# Patient Record
Sex: FEMALE | Race: WHITE | Employment: OTHER | URBAN - METROPOLITAN AREA
[De-identification: names, ages, dates, MRNs, and addresses within clinical notes are randomized per-mention and may not be internally consistent; named-entity substitution may affect disease eponyms.]

---

## 2020-06-03 LAB
ALBUMIN SERPL-MCNC: NORMAL G/DL
ALP BLD-CCNC: NORMAL U/L
ALT SERPL-CCNC: NORMAL U/L
ANION GAP SERPL CALCULATED.3IONS-SCNC: NORMAL MMOL/L
AST SERPL-CCNC: NORMAL U/L
BASOPHILS ABSOLUTE: NORMAL
BASOPHILS RELATIVE PERCENT: NORMAL
BILIRUB SERPL-MCNC: NORMAL MG/DL
BUN BLDV-MCNC: NORMAL MG/DL
CALCIUM SERPL-MCNC: NORMAL MG/DL
CHLORIDE BLD-SCNC: NORMAL MMOL/L
CHOLESTEROL, TOTAL: NORMAL
CHOLESTEROL/HDL RATIO: NORMAL
CO2: NORMAL
CREAT SERPL-MCNC: NORMAL MG/DL
EOSINOPHILS ABSOLUTE: NORMAL
EOSINOPHILS RELATIVE PERCENT: NORMAL
GFR CALCULATED: NORMAL
GLUCOSE BLD-MCNC: NORMAL MG/DL
HCT VFR BLD CALC: NORMAL %
HDLC SERPL-MCNC: NORMAL MG/DL
HEMOGLOBIN: NORMAL
LDL CHOLESTEROL CALCULATED: NORMAL
LYMPHOCYTES ABSOLUTE: NORMAL
LYMPHOCYTES RELATIVE PERCENT: NORMAL
MCH RBC QN AUTO: NORMAL PG
MCHC RBC AUTO-ENTMCNC: NORMAL G/DL
MCV RBC AUTO: NORMAL FL
MONOCYTES ABSOLUTE: NORMAL
MONOCYTES RELATIVE PERCENT: NORMAL
NEUTROPHILS ABSOLUTE: NORMAL
NEUTROPHILS RELATIVE PERCENT: NORMAL
NONHDLC SERPL-MCNC: NORMAL MG/DL
PLATELET # BLD: NORMAL 10*3/UL
PMV BLD AUTO: NORMAL FL
POTASSIUM SERPL-SCNC: NORMAL MMOL/L
RBC # BLD: NORMAL 10*6/UL
SODIUM BLD-SCNC: NORMAL MMOL/L
TOTAL PROTEIN: NORMAL
TRIGL SERPL-MCNC: NORMAL MG/DL
VLDLC SERPL CALC-MCNC: NORMAL MG/DL
WBC # BLD: NORMAL 10*3/UL

## 2020-07-21 RX ORDER — CYANOCOBALAMIN 1000 UG/ML
1000 INJECTION INTRAMUSCULAR; SUBCUTANEOUS ONCE
Qty: 1 ML | Refills: 0 | Status: SHIPPED | OUTPATIENT
Start: 2020-07-21 | End: 2021-07-06

## 2020-08-27 RX ORDER — RALOXIFENE HYDROCHLORIDE 60 MG/1
60 TABLET, FILM COATED ORAL DAILY
COMMUNITY
End: 2020-12-23 | Stop reason: SDUPTHER

## 2020-08-27 RX ORDER — MULTIVIT-MIN/IRON/FOLIC ACID/K 18-600-40
CAPSULE ORAL
COMMUNITY
End: 2022-08-08

## 2020-08-27 RX ORDER — LOSARTAN POTASSIUM 100 MG/1
100 TABLET ORAL DAILY
COMMUNITY
End: 2021-03-04 | Stop reason: SDUPTHER

## 2020-08-27 RX ORDER — VIT C/B6/B5/MAGNESIUM/HERB 173 50-5-6-5MG
CAPSULE ORAL
COMMUNITY
End: 2021-12-27

## 2020-08-27 RX ORDER — HYDROCHLOROTHIAZIDE 25 MG/1
25 TABLET ORAL DAILY
COMMUNITY
End: 2021-03-04 | Stop reason: SDUPTHER

## 2020-08-27 RX ORDER — OMEPRAZOLE 40 MG/1
40 CAPSULE, DELAYED RELEASE ORAL DAILY
COMMUNITY
End: 2021-03-04 | Stop reason: SDUPTHER

## 2020-08-27 RX ORDER — NAPROXEN 500 MG/1
500 TABLET ORAL 2 TIMES DAILY WITH MEALS
COMMUNITY
End: 2022-01-14

## 2020-08-27 RX ORDER — ROSUVASTATIN CALCIUM 10 MG/1
10 TABLET, COATED ORAL DAILY
COMMUNITY
End: 2021-03-04 | Stop reason: SDUPTHER

## 2020-08-27 RX ORDER — ASPIRIN 81 MG/1
81 TABLET ORAL DAILY
COMMUNITY

## 2020-08-27 RX ORDER — AMLODIPINE BESYLATE 5 MG/1
5 TABLET ORAL DAILY
COMMUNITY
End: 2021-01-11 | Stop reason: SDUPTHER

## 2020-08-27 RX ORDER — LOSARTAN POTASSIUM AND HYDROCHLOROTHIAZIDE 25; 100 MG/1; MG/1
1 TABLET ORAL DAILY
COMMUNITY
End: 2021-01-11

## 2020-08-27 SDOH — HEALTH STABILITY: MENTAL HEALTH: HOW OFTEN DO YOU HAVE A DRINK CONTAINING ALCOHOL?: NEVER

## 2020-12-23 RX ORDER — RALOXIFENE HYDROCHLORIDE 60 MG/1
60 TABLET, FILM COATED ORAL DAILY
Qty: 90 TABLET | Refills: 1 | Status: SHIPPED
Start: 2020-12-23 | End: 2021-06-30

## 2021-01-06 ENCOUNTER — NURSE ONLY (OUTPATIENT)
Dept: PRIMARY CARE CLINIC | Age: 81
End: 2021-01-06
Payer: COMMERCIAL

## 2021-01-06 DIAGNOSIS — K57.30 DIVERTICULAR DISEASE OF COLON: ICD-10-CM

## 2021-01-06 DIAGNOSIS — G45.9 TRANSIENT ISCHEMIC ATTACK: ICD-10-CM

## 2021-01-06 DIAGNOSIS — E78.5 HYPERLIPIDEMIA, UNSPECIFIED HYPERLIPIDEMIA TYPE: ICD-10-CM

## 2021-01-06 DIAGNOSIS — I10 BENIGN HYPERTENSION: ICD-10-CM

## 2021-01-06 DIAGNOSIS — E78.5 HYPERLIPIDEMIA, UNSPECIFIED HYPERLIPIDEMIA TYPE: Primary | ICD-10-CM

## 2021-01-06 LAB
BASOPHILS ABSOLUTE: 0.07 E9/L (ref 0–0.2)
BASOPHILS RELATIVE PERCENT: 1 % (ref 0–2)
CHOLESTEROL, TOTAL: 191 MG/DL (ref 0–199)
EOSINOPHILS ABSOLUTE: 0.89 E9/L (ref 0.05–0.5)
EOSINOPHILS RELATIVE PERCENT: 12.6 % (ref 0–6)
HCT VFR BLD CALC: 42.7 % (ref 34–48)
HDLC SERPL-MCNC: 88 MG/DL
HEMOGLOBIN: 13.8 G/DL (ref 11.5–15.5)
IMMATURE GRANULOCYTES #: 0.01 E9/L
IMMATURE GRANULOCYTES %: 0.1 % (ref 0–5)
LDL CHOLESTEROL CALCULATED: 90 MG/DL (ref 0–99)
LYMPHOCYTES ABSOLUTE: 2.58 E9/L (ref 1.5–4)
LYMPHOCYTES RELATIVE PERCENT: 36.4 % (ref 20–42)
MCH RBC QN AUTO: 29.7 PG (ref 26–35)
MCHC RBC AUTO-ENTMCNC: 32.3 % (ref 32–34.5)
MCV RBC AUTO: 92 FL (ref 80–99.9)
MONOCYTES ABSOLUTE: 0.63 E9/L (ref 0.1–0.95)
MONOCYTES RELATIVE PERCENT: 8.9 % (ref 2–12)
NEUTROPHILS ABSOLUTE: 2.91 E9/L (ref 1.8–7.3)
NEUTROPHILS RELATIVE PERCENT: 41 % (ref 43–80)
PDW BLD-RTO: 13 FL (ref 11.5–15)
PLATELET # BLD: 288 E9/L (ref 130–450)
PMV BLD AUTO: 9.8 FL (ref 7–12)
RBC # BLD: 4.64 E12/L (ref 3.5–5.5)
TRIGL SERPL-MCNC: 63 MG/DL (ref 0–149)
VLDLC SERPL CALC-MCNC: 13 MG/DL
WBC # BLD: 7.1 E9/L (ref 4.5–11.5)

## 2021-01-06 PROCEDURE — 36415 COLL VENOUS BLD VENIPUNCTURE: CPT | Performed by: NURSE PRACTITIONER

## 2021-01-07 LAB
ALBUMIN SERPL-MCNC: 4 G/DL (ref 3.5–5.2)
ALP BLD-CCNC: 78 U/L (ref 35–104)
ALT SERPL-CCNC: 18 U/L (ref 0–32)
ANION GAP SERPL CALCULATED.3IONS-SCNC: 13 MMOL/L (ref 7–16)
AST SERPL-CCNC: 14 U/L (ref 0–31)
BILIRUB SERPL-MCNC: 0.5 MG/DL (ref 0–1.2)
BUN BLDV-MCNC: 15 MG/DL (ref 8–23)
CALCIUM SERPL-MCNC: 10.1 MG/DL (ref 8.6–10.2)
CHLORIDE BLD-SCNC: 103 MMOL/L (ref 98–107)
CO2: 25 MMOL/L (ref 22–29)
CREAT SERPL-MCNC: 0.8 MG/DL (ref 0.5–1)
GFR AFRICAN AMERICAN: >60
GFR NON-AFRICAN AMERICAN: >60 ML/MIN/1.73
GLUCOSE BLD-MCNC: 90 MG/DL (ref 74–99)
POTASSIUM SERPL-SCNC: 4.3 MMOL/L (ref 3.5–5)
SODIUM BLD-SCNC: 141 MMOL/L (ref 132–146)
TOTAL PROTEIN: 6.8 G/DL (ref 6.4–8.3)
TSH SERPL DL<=0.05 MIU/L-ACNC: 2.8 UIU/ML (ref 0.27–4.2)

## 2021-01-11 ENCOUNTER — OFFICE VISIT (OUTPATIENT)
Dept: PRIMARY CARE CLINIC | Age: 81
End: 2021-01-11
Payer: COMMERCIAL

## 2021-01-11 VITALS
HEART RATE: 74 BPM | BODY MASS INDEX: 22.82 KG/M2 | TEMPERATURE: 98.1 F | SYSTOLIC BLOOD PRESSURE: 115 MMHG | OXYGEN SATURATION: 99 % | DIASTOLIC BLOOD PRESSURE: 80 MMHG | HEIGHT: 65 IN | WEIGHT: 137 LBS | RESPIRATION RATE: 18 BRPM

## 2021-01-11 DIAGNOSIS — E53.9 VITAMIN B DEFICIENCY: ICD-10-CM

## 2021-01-11 DIAGNOSIS — I10 ESSENTIAL HYPERTENSION: ICD-10-CM

## 2021-01-11 DIAGNOSIS — G31.9 CEREBRAL ATROPHY (HCC): ICD-10-CM

## 2021-01-11 DIAGNOSIS — I67.9 CEREBROVASCULAR SMALL VESSEL DISEASE: ICD-10-CM

## 2021-01-11 DIAGNOSIS — K57.30 DIVERTICULAR DISEASE OF COLON: ICD-10-CM

## 2021-01-11 DIAGNOSIS — E78.00 HYPERCHOLESTEROLEMIA: Primary | ICD-10-CM

## 2021-01-11 DIAGNOSIS — K21.9 GASTROESOPHAGEAL REFLUX DISEASE WITHOUT ESOPHAGITIS: ICD-10-CM

## 2021-01-11 DIAGNOSIS — Z13.29 SCREENING FOR THYROID DISORDER: ICD-10-CM

## 2021-01-11 PROCEDURE — 99214 OFFICE O/P EST MOD 30 MIN: CPT | Performed by: NURSE PRACTITIONER

## 2021-01-11 RX ORDER — AMLODIPINE BESYLATE 5 MG/1
5 TABLET ORAL DAILY
Qty: 90 TABLET | Refills: 1 | Status: SHIPPED
Start: 2021-01-11 | End: 2021-07-15 | Stop reason: SDUPTHER

## 2021-01-11 ASSESSMENT — ENCOUNTER SYMPTOMS
CONSTIPATION: 0
WHEEZING: 0
VOICE CHANGE: 0
CHEST TIGHTNESS: 0
NAUSEA: 0
COUGH: 0
ABDOMINAL PAIN: 0
BLOOD IN STOOL: 0
VOMITING: 0
SHORTNESS OF BREATH: 0
DIARRHEA: 0
BACK PAIN: 0
TROUBLE SWALLOWING: 0

## 2021-01-11 ASSESSMENT — PATIENT HEALTH QUESTIONNAIRE - PHQ9: SUM OF ALL RESPONSES TO PHQ QUESTIONS 1-9: 0

## 2021-01-11 NOTE — PATIENT INSTRUCTIONS
Patient Education        Learning About Diverticulosis and Diverticulitis  What are diverticulosis and diverticulitis? In diverticulosis and diverticulitis, pouches called diverticula form in the wall of the large intestine, or colon. · In diverticulosis, the pouches do not cause any pain or other symptoms. · In diverticulitis, the pouches get inflamed or infected and cause symptoms. Doctors aren't sure what causes these pouches in the colon. But they think that a low-fiber diet may play a role. Without fiber to add bulk to the stool, the colon has to work harder than normal to push the stool forward. The pressure from this may cause pouches to form in weak spots along the colon. Some people with diverticulosis get diverticulitis. But experts don't know why this happens. What are the symptoms? · In diverticulosis, most people don't have symptoms. But pouches sometimes bleed. · In diverticulitis, symptoms may last from a few hours to a week or more. They include:  ? Belly pain. This is usually in the lower left side. It is sometimes worse when you move. This is the most common symptom. ? Fever and chills. ? Bloating and gas. ? Diarrhea or constipation. ? Nausea and sometimes vomiting.  ? Not feeling like eating. How can you prevent diverticulitis? You may be able to lower your chance of getting diverticulitis. You can do this by taking steps to prevent constipation. · Eat fruits, vegetables, beans, and whole grains every day. These foods are high in fiber. · Drink plenty of fluids. (Drink enough so that your urine is light yellow or clear like water.) If you have kidney, heart, or liver disease and have to limit fluids, talk with your doctor before you increase the amount of fluids you drink. · Get at least 30 minutes of exercise on most days of the week. Walking is a good choice.  You also may want to do other activities, such as running, swimming, cycling, or playing tennis or team sports. · Take a fiber supplement, such as Citrucel or Metamucil, every day if needed. Read and follow all instructions on the label. · Schedule time each day for a bowel movement. Having a daily routine may help. Take your time and do not strain when having a bowel movement. Some people avoid nuts, seeds, berries, and popcorn. They believe that these foods might get trapped in the diverticula and cause pain. But there is no proof that these foods cause diverticulitis or make it worse. How are these problems treated? · The best way to treat diverticulosis is to avoid constipation. · Treatment for diverticulitis includes antibiotics. It often includes a change in your diet. You may need only liquids at first. Your doctor may suggest pain medicines for pain or belly cramps. In some cases, surgery may be needed. Follow-up care is a key part of your treatment and safety. Be sure to make and go to all appointments, and call your doctor if you are having problems. It's also a good idea to know your test results and keep a list of the medicines you take. Where can you learn more? Go to https://THYME.LiveRe. org and sign in to your Torrential account. Enter C282 in the [facility] box to learn more about \"Learning About Diverticulosis and Diverticulitis. \"     If you do not have an account, please click on the \"Sign Up Now\" link. Current as of: April 15, 2020               Content Version: 12.6  © 2908-9341 [employer]. Care instructions adapted under license by [facility] ([facility]). If you have questions about a medical condition or this instruction, always ask your healthcare professional. [place] [address] any warranty or liability for your use of this information.

## 2021-01-11 NOTE — PROGRESS NOTES
21  Mark Whitaker : 1940 Sex: female  Age: 79 y.o. Chief Complaint   Patient presents with    6 Month Follow-Up    Discuss Labs    Medication Refill       I really do love my visits with Narendra Bertrand she is a very spry 79-year-old white female who I will admit does seem a little down today. She has been avoiding going swimming which was a mainstay of her health and fitness for some time. Covid has kept her away from the pool. We discussed her getting back into the pool and the risk reward ratio associated with that. We also discussed the Covid vaccine and the risk reward ratio there. We talked about her colonoscopies and the concern for perforation of the bowel. And I told her she probably did not need another mammogram especially in the face of her never having a bad mammogram in the past.      CHRONIC CONDITION:  HTN: Stable hypertension, controlled on  amLODIPine (NORVASC) 5 MG tablet, Take 5 mg by mouth daily, Disp: , Rfl:   aspirin 81 MG EC tablet, Take 81 mg by mouth daily, Disp: , Rfl:   hydroCHLOROthiazide (HYDRODIURIL) 25 MG tablet, Take 25 mg by mouth daily, Disp: , Rfl:   losartan (COZAAR) 100 MG tablet, Take 100 mg by mouth daily, Disp: , Rfl:  , remains at a mild intensity but overall good control, without symptoms, no ringing in the ears, no headaches and no nose bleeds. Better on medications. Hyperlipidemia: Mild in intensity but controlled on   rosuvastatin (CRESTOR) 10 MG tablet, Take 10 mg by mouth daily, Disp: , Rfl: , without symptoms, no complications with dietary treatment regimen reporting no side effects or intolereances. Compliant with treatment and diet. No muscle aches, new joint pains or abd pain. Review of Systems   Constitutional: Negative for activity change, chills, diaphoresis, fatigue, fever and unexpected weight change. HENT: Negative for trouble swallowing and voice change. Eyes: Negative for visual disturbance.    Respiratory: Negative for cough, chest tightness, shortness of breath and wheezing. Cardiovascular: Negative for chest pain, palpitations and leg swelling. Gastrointestinal: Negative for abdominal pain, blood in stool, constipation, diarrhea, nausea and vomiting. Endocrine: Negative for polydipsia, polyphagia and polyuria. Genitourinary: Negative for dysuria, enuresis, frequency and hematuria. Musculoskeletal: Negative for arthralgias, back pain, gait problem, joint swelling, myalgias and neck stiffness. Skin: Negative for rash. Neurological: Negative for dizziness, seizures, syncope, facial asymmetry, weakness, light-headedness, numbness and headaches. Hematological: Does not bruise/bleed easily. Psychiatric/Behavioral: Negative for behavioral problems, confusion, hallucinations and suicidal ideas. The patient is not nervous/anxious.           Current Outpatient Medications:     raloxifene (EVISTA) 60 MG tablet, Take 1 tablet by mouth daily, Disp: 90 tablet, Rfl: 1    amLODIPine (NORVASC) 5 MG tablet, Take 5 mg by mouth daily, Disp: , Rfl:     Cholecalciferol (VITAMIN D) 50 MCG (2000 UT) CAPS capsule, Take by mouth, Disp: , Rfl:     omeprazole (PRILOSEC) 40 MG delayed release capsule, Take 40 mg by mouth daily, Disp: , Rfl:     rosuvastatin (CRESTOR) 10 MG tablet, Take 10 mg by mouth daily, Disp: , Rfl:     aspirin 81 MG EC tablet, Take 81 mg by mouth daily, Disp: , Rfl:     naproxen (NAPROSYN) 500 MG tablet, Take 500 mg by mouth 2 times daily (with meals), Disp: , Rfl:     Turmeric 500 MG CAPS, Take by mouth, Disp: , Rfl:     hydroCHLOROthiazide (HYDRODIURIL) 25 MG tablet, Take 25 mg by mouth daily, Disp: , Rfl:     losartan (COZAAR) 100 MG tablet, Take 100 mg by mouth daily, Disp: , Rfl:     cyanocobalamin 1000 MCG/ML injection, Inject 1 mL into the muscle once for 1 dose, Disp: 1 mL, Rfl: 0  No Known Allergies    Past Medical History:   Diagnosis Date    Diverticulosis     Hiatal hernia     Hyperlipidemia  Hypertension     Osteopenia     Varicosities of leg     Vitamin D deficiency      Past Surgical History:   Procedure Laterality Date    COLONOSCOPY      2012 AND 2015    TUMOR REMOVAL       Family History   Problem Relation Age of Onset    High Blood Pressure Mother     Heart Failure Mother     Other Father         PNEUMONIA    Elevated Lipids Sister     High Blood Pressure Sister     Cancer Brother     Cancer Son     Alcohol Abuse Son      Social History     Socioeconomic History    Marital status:       Spouse name: Not on file    Number of children: Not on file    Years of education: Not on file    Highest education level: Not on file   Occupational History    Not on file   Social Needs    Financial resource strain: Not on file    Food insecurity     Worry: Not on file     Inability: Not on file    Transportation needs     Medical: Not on file     Non-medical: Not on file   Tobacco Use    Smoking status: Never Smoker    Smokeless tobacco: Never Used   Substance and Sexual Activity    Alcohol use: Never     Frequency: Never    Drug use: Never    Sexual activity: Not on file   Lifestyle    Physical activity     Days per week: Not on file     Minutes per session: Not on file    Stress: Not on file   Relationships    Social connections     Talks on phone: Not on file     Gets together: Not on file     Attends Shinto service: Not on file     Active member of club or organization: Not on file     Attends meetings of clubs or organizations: Not on file     Relationship status: Not on file    Intimate partner violence     Fear of current or ex partner: Not on file     Emotionally abused: Not on file     Physically abused: Not on file     Forced sexual activity: Not on file   Other Topics Concern    Not on file   Social History Narrative    Not on file       Vitals:    01/11/21 0910   BP: 115/80   Pulse: 74   Resp: 18   Temp: 98.1 °F (36.7 °C)   SpO2: 99%   Weight: 137 lb (62.1 kg)   Height: 5' 5\" (1.651 m)       Physical Exam  Vitals signs and nursing note reviewed. Constitutional:       Appearance: Normal appearance. HENT:      Head: Normocephalic. Right Ear: Tympanic membrane and ear canal normal. There is no impacted cerumen. Left Ear: Tympanic membrane and ear canal normal. There is no impacted cerumen. Nose: Nose normal.      Mouth/Throat:      Mouth: Mucous membranes are dry. Eyes:      Extraocular Movements: Extraocular movements intact. Pupils: Pupils are equal, round, and reactive to light. Neck:      Musculoskeletal: No neck rigidity or muscular tenderness. Vascular: No carotid bruit. Cardiovascular:      Rate and Rhythm: Normal rate and regular rhythm. Pulses: Normal pulses. Heart sounds: Normal heart sounds. No murmur. No friction rub. No gallop. Pulmonary:      Effort: Pulmonary effort is normal. No respiratory distress. Breath sounds: Normal breath sounds. No stridor. No wheezing, rhonchi or rales. Chest:      Chest wall: No tenderness. Abdominal:      General: Bowel sounds are normal. There is no distension. Palpations: Abdomen is soft. Musculoskeletal:         General: No swelling, tenderness, deformity or signs of injury. Right lower leg: No edema. Left lower leg: No edema. Lymphadenopathy:      Cervical: No cervical adenopathy. Skin:     General: Skin is warm and dry. Capillary Refill: Capillary refill takes 2 to 3 seconds. Findings: No bruising, lesion or rash. Neurological:      General: No focal deficit present. Mental Status: She is alert and oriented to person, place, and time. Motor: No weakness. Gait: Gait normal.   Psychiatric:         Attention and Perception: Attention normal.         Mood and Affect: Mood normal.         Behavior: Behavior normal.         Thought Content: Thought content does not include homicidal or suicidal ideation.  Thought content does not include homicidal or suicidal plan. Assessment and Plan:  Angelica Yates was seen today for 6 month follow-up, discuss labs and medication refill. Diagnoses and all orders for this visit:    Hypercholesterolemia    Cerebral atrophy ([address]    Gastroesophageal reflux disease without esophagitis    Essential hypertension    Cerebrovascular small vessel disease    BMI 22.0-22.9, adult    Diverticular disease of colon    Vitamin B deficiency        No follow-ups on file. USPTF:    (2015) Colorectal Cancer: Screening --Adults aged 48 to 76 years  Grade: A (Recommended) recommends screening for colorectal cancer starting at age 48 years and continuing until age 76 years. (B/P 115/80) High Blood Pressure: Screening and Home Monitoring -- Adults  Grade: A (Recommended) recommends screening for high blood pressure in ages 25 years or older. obtain measurements outside of the clinical setting for diagnostic confirmation before starting treatment. Annual screening for adults aged 36 years or older or those who are at increased risk for blood pressure    (Controlled)  Lipid Disorders in Adults: Screening -- Men 28 and Older  Grade: A (Recommended) recommends screening men aged 28 and older for lipid disorders. (None) Alcohol Misuse: Screening and Behavioral Counseling Interventions in Primary Care -- Adults  Grade: B (Recommended) recommends that clinicians screen adults aged 25 years or older for alcohol misuse and provide persons engaged in risky or hazardous drinking with brief behavioral counseling interventions to reduce alcohol misuse. (BGL 90) Abnormal Blood Glucose and Type 2 Diabetes Mellitus: Screening -- Adults aged 36 to 79 years who are overweight or obese Grade: B (Recommended)    (BMI 22.80)  Obesity: Screening for and Management of-- All Adults  Grade: B(Recommended) recommends screening all adults for obesity.  Clinicians should offer or refer patients with a body mass index (BMI) of 30 kg/m2 or higher to intensive, multicomponent behavioral interventions. (Not a fall risk)  Fall Prevention -- Exercise/Physical Therapy: Community-dwelling Adults 72 Years or Older, Increased Risk for Falls   Grade: B (Recommended) recommends exercise or physical therapy to prevent falls in community-dwelling adults aged 72 years or older who are at increased risk for falls. (None noted or reported this visit)  Depression: Screening -- General adult population, including pregnant and postpartum women  Grade: B(Recommended) recommends screening for depression in the general adult population,  Screening should be implemented with adequate systems in place to ensure accurate diagnosis, effective treatment, and appropriate follow-up. (  ) Glaucoma: Screening - Adults and Diabetic Eye Exam     (TSH 2.8) Thyroid Dysfunction: Screening --      (2020) Breast Cancer: Screening with Mammography-- Women aged 48 to 76 years  Grade: B (Recommended) recommends biennial screening mammography for women aged 48 to 76 years. (2019 Osteopenia) Osteoporosis: Screening -- Women 65+ and Younger Women at Increased Risk  Grade: B(Recommended) recommends screening for osteoporosis in women aged 72 years or older and in younger women whose fracture risk is equal to or greater than that of a 42-year-old white woman who has no additional risk factors.                Seen By:  SCOOTER Berman - CNP

## 2021-02-04 ENCOUNTER — TELEPHONE (OUTPATIENT)
Dept: PRIMARY CARE CLINIC | Age: 81
End: 2021-02-04

## 2021-02-04 NOTE — TELEPHONE ENCOUNTER
Pt got her first covid vaccine on 1/20 , then got her b12 on Feb.2 (her grandson gives them to her). Now she's worried she shouldn't have done that. Will the b12 affect the covid vaccination?

## 2021-03-04 RX ORDER — LOSARTAN POTASSIUM 100 MG/1
100 TABLET ORAL DAILY
Qty: 90 TABLET | Refills: 1 | Status: SHIPPED
Start: 2021-03-04 | End: 2021-06-30

## 2021-03-04 RX ORDER — ROSUVASTATIN CALCIUM 10 MG/1
10 TABLET, COATED ORAL DAILY
Qty: 90 TABLET | Refills: 1 | Status: SHIPPED
Start: 2021-03-04 | End: 2022-01-24

## 2021-03-04 RX ORDER — HYDROCHLOROTHIAZIDE 25 MG/1
25 TABLET ORAL DAILY
Qty: 90 TABLET | Refills: 1 | Status: SHIPPED
Start: 2021-03-04 | End: 2021-07-09 | Stop reason: SDUPTHER

## 2021-03-04 RX ORDER — OMEPRAZOLE 40 MG/1
40 CAPSULE, DELAYED RELEASE ORAL DAILY
Qty: 90 CAPSULE | Refills: 1 | Status: SHIPPED
Start: 2021-03-04 | End: 2022-04-06 | Stop reason: SDUPTHER

## 2021-03-04 NOTE — TELEPHONE ENCOUNTER
Refill: HCTZ 25 MG 1 QD - OMEPRAZOLE 40 MG 1 QD - [facility]  MG 1 QD - ROSUVASTATIN 10 MG 1 QD -[facility]  90 DAY - patricio

## 2021-06-30 RX ORDER — LOSARTAN POTASSIUM 100 MG/1
TABLET ORAL
Qty: 90 TABLET | Refills: 1 | Status: SHIPPED
Start: 2021-06-30 | End: 2021-09-03 | Stop reason: SDUPTHER

## 2021-06-30 RX ORDER — RALOXIFENE HYDROCHLORIDE 60 MG/1
TABLET, FILM COATED ORAL
Qty: 90 TABLET | Refills: 1 | Status: SHIPPED
Start: 2021-06-30 | End: 2021-07-06

## 2021-07-02 ENCOUNTER — TELEPHONE (OUTPATIENT)
Dept: PRIMARY CARE CLINIC | Age: 81
End: 2021-07-02

## 2021-07-02 DIAGNOSIS — I10 ESSENTIAL HYPERTENSION: ICD-10-CM

## 2021-07-02 DIAGNOSIS — E78.00 HYPERCHOLESTEROLEMIA: ICD-10-CM

## 2021-07-02 DIAGNOSIS — Z13.29 SCREENING FOR THYROID DISORDER: ICD-10-CM

## 2021-07-02 DIAGNOSIS — E53.9 VITAMIN B DEFICIENCY: ICD-10-CM

## 2021-07-02 LAB
ALBUMIN SERPL-MCNC: 4.3 G/DL (ref 3.5–5.2)
ALP BLD-CCNC: 72 U/L (ref 35–104)
ALT SERPL-CCNC: 14 U/L (ref 0–32)
ANION GAP SERPL CALCULATED.3IONS-SCNC: 13 MMOL/L (ref 7–16)
AST SERPL-CCNC: 15 U/L (ref 0–31)
BASOPHILS ABSOLUTE: 0.04 E9/L (ref 0–0.2)
BASOPHILS RELATIVE PERCENT: 0.6 % (ref 0–2)
BILIRUB SERPL-MCNC: 0.5 MG/DL (ref 0–1.2)
BUN BLDV-MCNC: 18 MG/DL (ref 6–23)
CALCIUM SERPL-MCNC: 9.8 MG/DL (ref 8.6–10.2)
CHLORIDE BLD-SCNC: 103 MMOL/L (ref 98–107)
CHOLESTEROL, TOTAL: 160 MG/DL (ref 0–199)
CO2: 25 MMOL/L (ref 22–29)
CREAT SERPL-MCNC: 0.9 MG/DL (ref 0.5–1)
EOSINOPHILS ABSOLUTE: 1.19 E9/L (ref 0.05–0.5)
EOSINOPHILS RELATIVE PERCENT: 19.3 % (ref 0–6)
GFR AFRICAN AMERICAN: >60
GFR NON-AFRICAN AMERICAN: 60 ML/MIN/1.73
GLUCOSE BLD-MCNC: 79 MG/DL (ref 74–99)
HCT VFR BLD CALC: 40.8 % (ref 34–48)
HDLC SERPL-MCNC: 68 MG/DL
HEMOGLOBIN: 12.5 G/DL (ref 11.5–15.5)
IMMATURE GRANULOCYTES #: 0.01 E9/L
IMMATURE GRANULOCYTES %: 0.2 % (ref 0–5)
LDL CHOLESTEROL CALCULATED: 79 MG/DL (ref 0–99)
LYMPHOCYTES ABSOLUTE: 2.15 E9/L (ref 1.5–4)
LYMPHOCYTES RELATIVE PERCENT: 34.8 % (ref 20–42)
MCH RBC QN AUTO: 28.9 PG (ref 26–35)
MCHC RBC AUTO-ENTMCNC: 30.6 % (ref 32–34.5)
MCV RBC AUTO: 94.2 FL (ref 80–99.9)
MONOCYTES ABSOLUTE: 0.65 E9/L (ref 0.1–0.95)
MONOCYTES RELATIVE PERCENT: 10.5 % (ref 2–12)
NEUTROPHILS ABSOLUTE: 2.14 E9/L (ref 1.8–7.3)
NEUTROPHILS RELATIVE PERCENT: 34.6 % (ref 43–80)
PDW BLD-RTO: 13.3 FL (ref 11.5–15)
PLATELET # BLD: 227 E9/L (ref 130–450)
PMV BLD AUTO: 9.9 FL (ref 7–12)
POTASSIUM SERPL-SCNC: 3.5 MMOL/L (ref 3.5–5)
RBC # BLD: 4.33 E12/L (ref 3.5–5.5)
SODIUM BLD-SCNC: 141 MMOL/L (ref 132–146)
TOTAL PROTEIN: 6.8 G/DL (ref 6.4–8.3)
TRIGL SERPL-MCNC: 67 MG/DL (ref 0–149)
TSH SERPL DL<=0.05 MIU/L-ACNC: 2.64 UIU/ML (ref 0.27–4.2)
VITAMIN B-12: 672 PG/ML (ref 211–946)
VLDLC SERPL CALC-MCNC: 13 MG/DL
WBC # BLD: 6.2 E9/L (ref 4.5–11.5)

## 2021-07-06 RX ORDER — RALOXIFENE HYDROCHLORIDE 60 MG/1
TABLET, FILM COATED ORAL
Qty: 90 TABLET | Refills: 1 | Status: SHIPPED
Start: 2021-07-06 | End: 2021-12-30 | Stop reason: SDUPTHER

## 2021-07-06 RX ORDER — CYANOCOBALAMIN 1000 UG/ML
INJECTION INTRAMUSCULAR; SUBCUTANEOUS
Qty: 3 VIAL | Refills: 2 | Status: SHIPPED
Start: 2021-07-06 | End: 2022-03-18 | Stop reason: SDUPTHER

## 2021-07-09 ENCOUNTER — OFFICE VISIT (OUTPATIENT)
Dept: PRIMARY CARE CLINIC | Age: 81
End: 2021-07-09
Payer: MEDICARE

## 2021-07-09 VITALS
BODY MASS INDEX: 21.99 KG/M2 | WEIGHT: 132 LBS | HEIGHT: 65 IN | SYSTOLIC BLOOD PRESSURE: 112 MMHG | HEART RATE: 76 BPM | OXYGEN SATURATION: 97 % | DIASTOLIC BLOOD PRESSURE: 70 MMHG | TEMPERATURE: 97.4 F

## 2021-07-09 DIAGNOSIS — I67.9 CEREBROVASCULAR SMALL VESSEL DISEASE: ICD-10-CM

## 2021-07-09 DIAGNOSIS — E78.00 HYPERCHOLESTEROLEMIA: ICD-10-CM

## 2021-07-09 DIAGNOSIS — E53.9 VITAMIN B DEFICIENCY: ICD-10-CM

## 2021-07-09 DIAGNOSIS — G31.9 CEREBRAL ATROPHY (HCC): ICD-10-CM

## 2021-07-09 DIAGNOSIS — G45.9 TRANSIENT ISCHEMIC ATTACK: ICD-10-CM

## 2021-07-09 DIAGNOSIS — M17.11 PRIMARY OSTEOARTHRITIS OF RIGHT KNEE: ICD-10-CM

## 2021-07-09 DIAGNOSIS — I10 ESSENTIAL HYPERTENSION: Primary | ICD-10-CM

## 2021-07-09 DIAGNOSIS — M17.12 PRIMARY OSTEOARTHRITIS OF LEFT KNEE: ICD-10-CM

## 2021-07-09 DIAGNOSIS — K21.9 GASTROESOPHAGEAL REFLUX DISEASE WITHOUT ESOPHAGITIS: ICD-10-CM

## 2021-07-09 PROCEDURE — 96372 THER/PROPH/DIAG INJ SC/IM: CPT | Performed by: NURSE PRACTITIONER

## 2021-07-09 PROCEDURE — 99214 OFFICE O/P EST MOD 30 MIN: CPT | Performed by: NURSE PRACTITIONER

## 2021-07-09 RX ORDER — B-COMPLEX WITH VITAMIN C
TABLET ORAL
COMMUNITY

## 2021-07-09 RX ORDER — TRIAMCINOLONE ACETONIDE 40 MG/ML
40 INJECTION, SUSPENSION INTRA-ARTICULAR; INTRAMUSCULAR ONCE
Status: COMPLETED | OUTPATIENT
Start: 2021-07-09 | End: 2021-07-09

## 2021-07-09 RX ORDER — HYDROCHLOROTHIAZIDE 25 MG/1
25 TABLET ORAL DAILY
Qty: 90 TABLET | Refills: 1 | Status: SHIPPED
Start: 2021-07-09 | End: 2021-09-03 | Stop reason: SDUPTHER

## 2021-07-09 RX ORDER — LIDOCAINE HYDROCHLORIDE 10 MG/ML
3 INJECTION, SOLUTION INFILTRATION; PERINEURAL ONCE
Status: COMPLETED | OUTPATIENT
Start: 2021-07-09 | End: 2021-07-09

## 2021-07-09 RX ORDER — VITAMIN E 268 MG
CAPSULE ORAL
COMMUNITY

## 2021-07-09 RX ADMIN — TRIAMCINOLONE ACETONIDE 40 MG: 40 INJECTION, SUSPENSION INTRA-ARTICULAR; INTRAMUSCULAR at 15:17

## 2021-07-09 RX ADMIN — LIDOCAINE HYDROCHLORIDE 3 ML: 10 INJECTION, SOLUTION INFILTRATION; PERINEURAL at 15:15

## 2021-07-09 SDOH — ECONOMIC STABILITY: FOOD INSECURITY: WITHIN THE PAST 12 MONTHS, YOU WORRIED THAT YOUR FOOD WOULD RUN OUT BEFORE YOU GOT MONEY TO BUY MORE.: NEVER TRUE

## 2021-07-09 SDOH — ECONOMIC STABILITY: FOOD INSECURITY: WITHIN THE PAST 12 MONTHS, THE FOOD YOU BOUGHT JUST DIDN'T LAST AND YOU DIDN'T HAVE MONEY TO GET MORE.: NEVER TRUE

## 2021-07-09 ASSESSMENT — ENCOUNTER SYMPTOMS
WHEEZING: 0
CONSTIPATION: 0
COUGH: 0
SHORTNESS OF BREATH: 0
BLOOD IN STOOL: 0
VOICE CHANGE: 0
TROUBLE SWALLOWING: 0
ABDOMINAL PAIN: 0
DIARRHEA: 0
BACK PAIN: 0
CHEST TIGHTNESS: 0
NAUSEA: 0
VOMITING: 0

## 2021-07-09 ASSESSMENT — SOCIAL DETERMINANTS OF HEALTH (SDOH): HOW HARD IS IT FOR YOU TO PAY FOR THE VERY BASICS LIKE FOOD, HOUSING, MEDICAL CARE, AND HEATING?: NOT HARD AT ALL

## 2021-07-09 NOTE — PATIENT INSTRUCTIONS
Patient Education        Body Mass Index: Care Instructions  Your Care Instructions     Body mass index (BMI) can help you see if your weight is raising your risk for health problems. It uses a formula to compare how much you weigh with how tall you are. · A BMI lower than 18.5 is considered underweight. · A BMI between 18.5 and 24.9 is considered healthy. · A BMI between 25 and 29.9 is considered overweight. A BMI of 30 or higher is considered obese. If your BMI is in the normal range, it means that you have a lower risk for weight-related health problems. If your BMI is in the overweight or obese range, you may be at increased risk for weight-related health problems, such as high blood pressure, heart disease, stroke, arthritis or joint pain, and diabetes. If your BMI is in the underweight range, you may be at increased risk for health problems such as fatigue, lower protection (immunity) against illness, muscle loss, bone loss, hair loss, and hormone problems. BMI is just one measure of your risk for weight-related health problems. You may be at higher risk for health problems if you are not active, you eat an unhealthy diet, or you drink too much alcohol or use tobacco products. Follow-up care is a key part of your treatment and safety. Be sure to make and go to all appointments, and call your doctor if you are having problems. It's also a good idea to know your test results and keep a list of the medicines you take. How can you care for yourself at home? · Practice healthy eating habits. This includes eating plenty of fruits, vegetables, whole grains, lean protein, and low-fat dairy. · If your doctor recommends it, get more exercise. Walking is a good choice. Bit by bit, increase the amount you walk every day. Try for at least 30 minutes on most days of the week. · Do not smoke. Smoking can increase your risk for health problems.  If you need help quitting, talk to your doctor about stop-smoking programs and medicines. These can increase your chances of quitting for good. · Limit alcohol to 2 drinks a day for men and 1 drink a day for women. Too much alcohol can cause health problems. If you have a BMI higher than 25  · Your doctor may do other tests to check your risk for weight-related health problems. This may include measuring the distance around your waist. A waist measurement of more than 40 inches in men or 35 inches in women can increase the risk of weight-related health problems. · Talk with your doctor about steps you can take to stay healthy or improve your health. You may need to make lifestyle changes to lose weight and stay healthy, such as changing your diet and getting regular exercise. If you have a BMI lower than 18.5  · Your doctor may do other tests to check your risk for health problems. · Talk with your doctor about steps you can take to stay healthy or improve your health. You may need to make lifestyle changes to gain or maintain weight and stay healthy, such as getting more healthy foods in your diet and doing exercises to build muscle. Where can you learn more? Go to https://[email or web address].[email or web address] org and sign in to your [employer] account. Enter S176 in the [email or web address] box to learn more about \"Body Mass Index: Care Instructions. \"     If you do not have an account, please click on the \"Sign Up Now\" link. Current as of: March 17, 2021               Content Version: 12.9  © 8979-8983 [employer], Incorporated. Care instructions adapted under license by [facility] ([facility]). If you have questions about a medical condition or this instruction, always ask your healthcare professional. [place] [address] any warranty or liability for your use of this information.          Patient Education        Gastroesophageal Reflux Disease (GERD): Care Instructions  Overview     Gastroesophageal reflux disease (GERD) is the backward flow of stomach acid into the esophagus. The esophagus is the tube that leads from your throat to your stomach. A one-way valve prevents the stomach acid from backing up into this tube. But when you have GERD, this valve does not close tightly enough. This can also cause pain and swelling in your esophagus. (This is called esophagitis.)  If you have mild GERD symptoms including heartburn, you may be able to control the problem with antacids or over-the-counter medicine. You can also make lifestyle changes to help reduce your symptoms. These include changing your diet and eating habits, such as not eating late at night and losing weight. Follow-up care is a key part of your treatment and safety. Be sure to make and go to all appointments, and call your doctor if you are having problems. It's also a good idea to know your test results and keep a list of the medicines you take. How can you care for yourself at home? · Take your medicines exactly as prescribed. Call your doctor if you think you are having a problem with your medicine. · Your doctor may recommend over-the-counter medicine. For mild or occasional indigestion, antacids, such as Tums, Gaviscon, Mylanta, or Maalox, may help. Your doctor also may recommend over-the-counter acid reducers, such as famotidine (Pepcid AC), cimetidine (Tagamet HB), or omeprazole (Prilosec). Read and follow all instructions on the label. If you use these medicines often, talk with your doctor. · Change your eating habits. ? It's best to eat several small meals instead of two or three large meals. ? After you eat, wait 2 to 3 hours before you lie down. ? Chocolate, mint, and alcohol can make GERD worse. ? Spicy foods, foods that have a lot of acid (like tomatoes and oranges), and coffee can make GERD symptoms worse in some people. If your symptoms are worse after you eat a certain food, you may want to stop eating that food to see if your symptoms get better.   · Do not smoke or chew tobacco. Smoking can

## 2021-07-09 NOTE — PROGRESS NOTES
Kanika Jacobsen : 1940 Sex: female  Age: 79 y.o. Chief Complaint   Patient presents with    6 Month Follow-Up    Discuss Labs    Medication Refill       Assessment and Plan:  Taylor Sam was seen today for 6 month follow-up, discuss labs and medication refill. Diagnoses and all orders for this visit:    Essential hypertension  -     CBC Auto Differential; Future  -     Comprehensive Metabolic Panel, Fasting; Future    Hypercholesterolemia  -     Lipid Panel; Future    Cerebral atrophy (HCC)    Cerebrovascular small vessel disease    Gastroesophageal reflux disease without esophagitis    Vitamin B deficiency    Transient ischemic attack    BMI 22.0-22.9, adult    Primary osteoarthritis of left knee  -     XR KNEE RIGHT (3 VIEWS); Future  -     XR KNEE LEFT (3 VIEWS); Future    Primary osteoarthritis of right knee  -     XR KNEE RIGHT (3 VIEWS); Future    Other orders  -     hydroCHLOROthiazide (HYDRODIURIL) 25 MG tablet; Take 1 tablet by mouth daily  -     triamcinolone acetonide (KENALOG-40) injection 40 mg  -     lidocaine 1 % injection 3 mL    12 min spent performing and discussing The procedure risks, hazards and alternatives with the patient. Medications used: 1% lidocaine without epi   3  ml, mixed with 40 mg/ml of Kenalog    The area over the affected joint was prepped in the usual sterile manner the needle was inserted anteriorly  in the joint and negative aspiration was noted 0 /cc of clear synovial aspirate was noted then and then steroid was injected. There were no complications during the procedure. Follow-up: The patient tolerated the procedure well without complications standard post procedure care was explained and return precautions given to the patient. [id number]  KNEE    MEDICAL IMAGING REPORT:    This medical imaging study is provided a preliminary interpretation by TESS Murdock NP    FINDINGS:  Right and Left knee, 3 views.  No fracture, or dislocation  There is considerable degenerative changes noted with increased joint space narrowing. No lytic or sclerotic lesion is identified. No effusion is seen. No soft tissue calcification or radio-opaque foreign body is detected. IMPRESSION:  DJD right and left knee x-ray. Return for Routine Visit with Labs. Educational materials  printed for patient's review and were included in patient instructions on her After Visit Summary and given to patient at the end of visit. Counseled regarding above diagnosis, including possible risks and complications,  especially if left uncontrolled. Counseled regarding the possible side effects, risks, benefits and alternatives to treatment; patient and/or guardian verbalizes understanding, agrees, feels comfortable with and wishes to proceed with above treatment plan. Advised patient to call with any new medication issues, and read all Rx info from pharmacy to assure aware of all possible risks and side effects of medication before taking. Reviewed age and gender appropriate health screening exams and vaccinations. Advised patient regarding importance of keeping up with recommended health maintenance and to schedule as soon as possible if overdue, as this is important in assessing for undiagnosed pathology, especially cancer, as well as protecting against potentially harmful/life threatening disease. Patient verbalizes understanding and agrees with above counseling, assessment and plan. All questions answered. On 07/09/21 I have spent 30 reviewing previous notes, test results and face to face with the patient discussing the diagnosis and importance of compliance with the treatment plan as well as documenting on the day of the visit. Educational materials exercises printed for patient's review and were included in patient instructions on their After Visit Summary and given to patient at the end of visit. population, including pregnant and postpartum women  Grade: B(Recommended) recommends screening for depression in the general adult population,  Screening should be implemented with adequate systems in place to ensure accurate diagnosis, effective treatment, and appropriate follow-up. (  ) Glaucoma: Screening - Adults and Diabetic Eye Exam     (TSH 2.6) Thyroid Dysfunction: Screening --      (2020) Breast Cancer: Screening with Mammography-- Women aged 48 to 76 years  Grade: B (Recommended) recommends biennial screening mammography for women aged 48 to 76 years. (2019 Osteopenia) Osteoporosis: Screening -- Women 65+ and Younger Women at Increased Risk  Grade: B(Recommended) recommends screening for osteoporosis in women aged 72 years or older and in younger women whose fracture risk is equal to or greater than that of a 60-year-old white woman who has no additional risk factors. Today  No issues to discuss is doing well she is back into swimming and is helping her son scrapping the house getting ready to paint again is in better spirits than the last time she was here. LAST VISIT  I really do love my visits with Whitney Mejía she is a very spry 58-year-old white female who I will admit does seem a little down today. She has been avoiding going swimming which was a mainstay of her health and fitness for some time. Covid has kept her away from the pool. We discussed her getting back into the pool and the risk reward ratio associated with that. We also discussed the Covid vaccine and the risk reward ratio there. We talked about her colonoscopies and the concern for perforation of the bowel.   And I told her she probably did not need another mammogram especially in the face of her never having a bad mammogram in the past.      CHRONIC CONDITION:  HTN: Stable hypertension, controlled on  amLODIPine (NORVASC) 5 MG tablet, Take 5 mg by mouth daily, Disp: , Rfl:   aspirin 81 MG EC tablet, Take 81 mg by mouth daily, Disp: , Rfl:   hydroCHLOROthiazide (HYDRODIURIL) 25 MG tablet, Take 25 mg by mouth daily, Disp: , Rfl:   losartan (COZAAR) 100 MG tablet, Take 100 mg by mouth daily, Disp: , Rfl:  , remains at a mild intensity but overall good control, without symptoms, no ringing in the ears, no headaches and no nose bleeds. Better on medications. Hyperlipidemia: Mild in intensity but controlled on   rosuvastatin (CRESTOR) 10 MG tablet, Take 10 mg by mouth daily, Disp: , Rfl: , without symptoms, no complications with dietary treatment regimen reporting no side effects or intolereances. Compliant with treatment and diet. No muscle aches, new joint pains or abd pain. Review of Systems   Constitutional: Negative for activity change, chills, diaphoresis, fatigue, fever and unexpected weight change. HENT: Negative for trouble swallowing and voice change. Eyes: Negative for visual disturbance. Respiratory: Negative for cough, chest tightness, shortness of breath and wheezing. Cardiovascular: Negative for chest pain, palpitations and leg swelling. Gastrointestinal: Negative for abdominal pain, blood in stool, constipation, diarrhea, nausea and vomiting. Endocrine: Negative for polydipsia, polyphagia and polyuria. Genitourinary: Negative for dysuria, enuresis, frequency and hematuria. Musculoskeletal: Negative for arthralgias, back pain, gait problem, joint swelling, myalgias and neck stiffness. Skin: Negative for rash. Neurological: Negative for dizziness, seizures, syncope, facial asymmetry, weakness, light-headedness, numbness and headaches. Hematological: Does not bruise/bleed easily. Psychiatric/Behavioral: Negative for behavioral problems, confusion, hallucinations and suicidal ideas. The patient is not nervous/anxious.           Current Outpatient Medications:     vitamin E 400 UNIT capsule, Take by mouth, Disp: , Rfl:     Calcium Carbonate-Vitamin D (OYSTER SHELL CALCIUM/D) 500-200 MG-UNIT TABS, Take by mouth, Disp: , Rfl:     hydroCHLOROthiazide (HYDRODIURIL) 25 MG tablet, Take 1 tablet by mouth daily, Disp: 90 tablet, Rfl: 1    cyanocobalamin 1000 MCG/ML injection, INJECT 1 ML PER MONTH AS DIRECTED, Disp: 3 vial, Rfl: 2    raloxifene (EVISTA) 60 MG tablet, TAKE 1 TABLET DAILY, Disp: 90 tablet, Rfl: 1    losartan (COZAAR) 100 MG tablet, TAKE 1 TABLET ONCE DAILY, Disp: 90 tablet, Rfl: 1    omeprazole (PRILOSEC) 40 MG delayed release capsule, Take 1 capsule by mouth daily, Disp: 90 capsule, Rfl: 1    rosuvastatin (CRESTOR) 10 MG tablet, Take 1 tablet by mouth daily, Disp: 90 tablet, Rfl: 1    Cholecalciferol (VITAMIN D) 50 MCG (2000 UT) CAPS capsule, Take by mouth, Disp: , Rfl:     aspirin 81 MG EC tablet, Take 81 mg by mouth daily, Disp: , Rfl:     naproxen (NAPROSYN) 500 MG tablet, Take 500 mg by mouth 2 times daily (with meals), Disp: , Rfl:     Turmeric 500 MG CAPS, Take by mouth, Disp: , Rfl:     amLODIPine (NORVASC) 5 MG tablet, Take 1 tablet by mouth daily, Disp: 90 tablet, Rfl: 1  No Known Allergies    Past Medical History:   Diagnosis Date    Diverticulosis     Hiatal hernia     Hyperlipidemia     Hypertension     Osteopenia     Varicosities of leg     Vitamin D deficiency      Past Surgical History:   Procedure Laterality Date    COLONOSCOPY      2012 AND 2015    TUMOR REMOVAL       Family History   Problem Relation Age of Onset    High Blood Pressure Mother     Heart Failure Mother     Other Father         PNEUMONIA    Elevated Lipids Sister     High Blood Pressure Sister     Cancer Brother     Cancer Son     Alcohol Abuse Son      Social History     Socioeconomic History    Marital status:       Spouse name: Not on file    Number of children: Not on file    Years of education: Not on file    Highest education level: Not on file   Occupational History    Not on file   Tobacco Use    Smoking status: Never Smoker    Smokeless tobacco: Never Used Substance and Sexual Activity    Alcohol use: Never    Drug use: Never    Sexual activity: Not on file   Other Topics Concern    Not on file   Social History Narrative    Not on file     Social Determinants of Health     Financial Resource Strain: Low Risk     Difficulty of Paying Living Expenses: Not hard at all   Food Insecurity: No Food Insecurity    Worried About Running Out of Food in the Last Year: Never true    [address] in the Last Year: Never true   Transportation Needs:     Lack of Transportation (Medical):  Lack of Transportation (Non-Medical):    Physical Activity:     Days of Exercise per Week:     Minutes of Exercise per Session:    Stress:     Feeling of Stress :    Social Connections:     Frequency of Communication with Friends and Family:     Frequency of Social Gatherings with Friends and Family:     Attends [employer] Services:     Active Member of Clubs or Organizations:     Attends Club or Organization Meetings:     Marital Status:    Intimate Partner Violence:     Fear of Current or Ex-Partner:     Emotionally Abused:     Physically Abused:     Sexually Abused:        Vitals:    07/09/21 1306   BP: 112/70   Pulse: 76   Temp: 97.4 °F (36.3 °C)   SpO2: 97%   Weight: 132 lb (59.9 kg)   Height: 5' 5\" (1.651 m)       Physical Exam  Vitals and nursing note reviewed. Constitutional:       Appearance: Normal appearance. HENT:      Head: Normocephalic. Right Ear: Tympanic membrane and ear canal normal. There is no impacted cerumen. Left Ear: Tympanic membrane and ear canal normal. There is no impacted cerumen. Nose: Nose normal.      Mouth/Throat:      Mouth: Mucous membranes are dry. Eyes:      Extraocular Movements: Extraocular movements intact. Pupils: Pupils are equal, round, and reactive to light. Neck:      Vascular: No carotid bruit. Cardiovascular:      Rate and Rhythm: Normal rate and regular rhythm. Pulses: Normal pulses. Heart sounds: Normal heart sounds. No murmur heard. No friction rub. No gallop. Pulmonary:      Effort: Pulmonary effort is normal. No respiratory distress. Breath sounds: Normal breath sounds. No stridor. No wheezing, rhonchi or rales. Chest:      Chest wall: No tenderness. Abdominal:      General: Bowel sounds are normal. There is no distension. Palpations: Abdomen is soft. Musculoskeletal:         General: No swelling, tenderness, deformity or signs of injury. Cervical back: No rigidity. No muscular tenderness. Right lower leg: No edema. Left lower leg: No edema. Lymphadenopathy:      Cervical: No cervical adenopathy. Skin:     General: Skin is warm and dry. Capillary Refill: Capillary refill takes 2 to 3 seconds. Findings: No bruising, lesion or rash. Neurological:      General: No focal deficit present. Mental Status: She is alert and oriented to person, place, and time. Motor: No weakness. Gait: Gait normal.   Psychiatric:         Attention and Perception: Attention normal.         Mood and Affect: Mood normal.         Behavior: Behavior normal.         Thought Content: Thought content does not include homicidal or suicidal ideation. Thought content does not include homicidal or suicidal plan.               Seen By:  Macarena Charles, SCOOTER - CNP

## 2021-07-15 RX ORDER — AMLODIPINE BESYLATE 5 MG/1
5 TABLET ORAL DAILY
Qty: 90 TABLET | Refills: 1 | Status: SHIPPED
Start: 2021-07-15 | End: 2022-01-24

## 2021-09-03 RX ORDER — HYDROCHLOROTHIAZIDE 25 MG/1
25 TABLET ORAL DAILY
Qty: 90 TABLET | Refills: 1 | Status: SHIPPED
Start: 2021-09-03 | End: 2022-08-29

## 2021-09-03 RX ORDER — LOSARTAN POTASSIUM 100 MG/1
TABLET ORAL
Qty: 90 TABLET | Refills: 1 | Status: SHIPPED
Start: 2021-09-03 | End: 2021-09-03

## 2021-09-03 RX ORDER — LOSARTAN POTASSIUM 100 MG/1
TABLET ORAL
Qty: 90 TABLET | Refills: 1 | Status: SHIPPED
Start: 2021-09-03 | End: 2022-03-01

## 2021-11-11 NOTE — TELEPHONE ENCOUNTER
INFORMED PT.
Pt has appt.  With Richard next week and states that Haydee Fuller gives her knee injections and is wondering if she cn have injection in both knees and if so would she be able to drive after
Routed to [facility]
Yes and yes
Detail Level: Zone
General Sunscreen Counseling: I recommended a broad spectrum sunscreen with a SPF of 30 or higher.  I explained that SPF 30 sunscreens block approximately 97 percent of the sun's harmful rays.  Sunscreens should be applied at least 15 minutes prior to expected sun exposure and then every 2 hours after that as long as sun exposure continues. If swimming or exercising sunscreen should be reapplied every 45 minutes to an hour after getting wet or sweating.  One ounce, or the equivalent of a shot glass full of sunscreen, is adequate to protect the skin not covered by a bathing suit. I also recommended a lip balm with a sunscreen as well. Sun protective clothing can be used in lieu of sunscreen but must be worn the entire time you are exposed to the sun's rays.
Products Recommended: Local pharmacy: Neutrogena, Cerave, Cetaphil, La Roche Posay\\nAmazon: Elta MD, Niki\\Jadon [facility] office: ZO or IS clinical

## 2021-12-27 ENCOUNTER — OFFICE VISIT (OUTPATIENT)
Dept: PRIMARY CARE CLINIC | Age: 81
End: 2021-12-27
Payer: MEDICARE

## 2021-12-27 VITALS
SYSTOLIC BLOOD PRESSURE: 110 MMHG | BODY MASS INDEX: 21.66 KG/M2 | OXYGEN SATURATION: 97 % | HEIGHT: 65 IN | RESPIRATION RATE: 16 BRPM | WEIGHT: 130 LBS | TEMPERATURE: 97.7 F | DIASTOLIC BLOOD PRESSURE: 70 MMHG | HEART RATE: 79 BPM

## 2021-12-27 DIAGNOSIS — S29.011A MUSCLE STRAIN OF CHEST WALL, INITIAL ENCOUNTER: ICD-10-CM

## 2021-12-27 DIAGNOSIS — M54.6 CHRONIC RIGHT-SIDED THORACIC BACK PAIN: Primary | ICD-10-CM

## 2021-12-27 DIAGNOSIS — R05.9 COUGH: ICD-10-CM

## 2021-12-27 DIAGNOSIS — G89.29 CHRONIC RIGHT-SIDED THORACIC BACK PAIN: Primary | ICD-10-CM

## 2021-12-27 LAB
BILIRUBIN, POC: NORMAL
BLOOD URINE, POC: NORMAL
CLARITY, POC: NORMAL
COLOR, POC: YELLOW
GLUCOSE URINE, POC: NORMAL
KETONES, POC: NORMAL
LEUKOCYTE EST, POC: NORMAL
NITRITE, POC: NORMAL
PH, POC: 7.5
PROTEIN, POC: NORMAL
SPECIFIC GRAVITY, POC: 1.02
UROBILINOGEN, POC: 0.2

## 2021-12-27 PROCEDURE — 99214 OFFICE O/P EST MOD 30 MIN: CPT | Performed by: NURSE PRACTITIONER

## 2021-12-27 PROCEDURE — 81002 URINALYSIS NONAUTO W/O SCOPE: CPT | Performed by: NURSE PRACTITIONER

## 2021-12-27 RX ORDER — TIZANIDINE 2 MG/1
2 TABLET ORAL 2 TIMES DAILY PRN
Qty: 30 TABLET | Refills: 0 | Status: SHIPPED | OUTPATIENT
Start: 2021-12-27 | End: 2022-01-11

## 2021-12-27 RX ORDER — MELOXICAM 15 MG/1
15 TABLET ORAL DAILY
Qty: 15 TABLET | Refills: 0 | Status: SHIPPED
Start: 2021-12-27 | End: 2022-01-14

## 2021-12-27 ASSESSMENT — ENCOUNTER SYMPTOMS
COUGH: 0
CONSTIPATION: 0
ABDOMINAL PAIN: 0
BLOOD IN STOOL: 0
CHEST TIGHTNESS: 0
BACK PAIN: 1
VOMITING: 0
NAUSEA: 0
SHORTNESS OF BREATH: 0
DIARRHEA: 0
VOICE CHANGE: 0
TROUBLE SWALLOWING: 0
WHEEZING: 0

## 2021-12-27 NOTE — PATIENT INSTRUCTIONS
Patient Education        Muscle Strain: Care Instructions  Your Care Instructions     A muscle strain happens when you overstretch, or pull, a muscle. It can happen when you exercise or lift something or when you have an accident. Rest and other home care can help the muscle heal.  Follow-up care is a key part of your treatment and safety. Be sure to make and go to all appointments, and call your doctor if you are having problems. It's also a good idea to know your test results and keep a list of the medicines you take. How can you care for yourself at home? · Rest the strained muscle. Do not put weight on it for a day or two. If your doctor advises you to, use crutches or a sling to rest a sore limb. · Put ice or a cold pack on the sore muscle for 10 to 20 minutes at a time to stop swelling. Put a thin cloth between the ice pack and your skin. · Prop up the sore arm or leg on a pillow when you ice it or anytime you sit or lie down during the next 3 days. Try to keep it above the level of your heart. This will help reduce swelling. · Take pain medicines exactly as directed. ? If the doctor gave you a prescription medicine for pain, take it as prescribed. ? If you are not taking a prescription pain medicine, ask your doctor if you can take an over-the-counter medicine. · Do not do anything that makes the pain worse. Return to exercise gradually as you feel better. When should you call for help? Call your doctor now or seek immediate medical care if:    · You have new severe pain.     · Your injured limb is cool or pale or changes color.     · You have tingling, weakness, or numbness in your injured limb.     · You cannot move the injured area. Watch closely for changes in your health, and be sure to contact your doctor if:    · You cannot put weight on a joint, or it feels unsteady when you walk.     · Pain and swelling get worse or do not start to get better after 2 days of home treatment.    Where can you learn more? Go to [email or web address] and sign in to your [employer] account. Enter O651 in the Play Megaphone box to learn more about \"Muscle Strain: Care Instructions. \"     If you do not have an account, please click on the \"Sign Up Now\" link. Current as of: July 1, 2021               Content Version: 13.1  © 2006-2021 [employer]. Care instructions adapted under license by [facility] ([facility]). If you have questions about a medical condition or this instruction, always ask your healthcare professional. [place] [address] any warranty or liability for your use of this information. Patient Education        Back Stretches: Exercises  Introduction  Here are some examples of exercises for stretching your back. Start each exercise slowly. Ease off the exercise if you start to have pain. Your doctor or physical therapist will tell you when you can start these exercises and which ones will work best for you. How to do the exercises  Overhead stretch    1. Stand comfortably with your feet shoulder-width apart. 2. Looking straight ahead, raise both arms over your head and reach toward the ceiling. Do not allow your head to tilt back. 3. Hold for 15 to 30 seconds, then lower your arms to your sides. 4. Repeat 2 to 4 times. Side stretch    1. Stand comfortably with your feet shoulder-width apart. 2. Raise one arm over your head, and then lean to the other side. 3. Slide your hand down your leg as you let the weight of your arm gently stretch your side muscles. Hold for 15 to 30 seconds. 4. Repeat 2 to 4 times on each side. Press-up    1. Lie on your stomach, supporting your body with your forearms. 2. Press your elbows down into the floor to raise your upper back. As you do this, relax your stomach muscles and allow your back to arch without using your back muscles. As your press up, do not let your hips or pelvis come off the floor.   3. Hold for 15

## 2021-12-27 NOTE — PROGRESS NOTES
Ki Cavanaugh (:  1940) is a 79 y.o. female,Established patient, here for evaluation of the following chief complaint(s):  Back Pain, Abdominal Pain, and Cough         ASSESSMENT/PLAN:  1. Chronic right-sided thoracic back pain  Comments:  Pain has been going on for over 3 weeks. It is reproducible and the UA came back without any blood or sign of infection differential of muscular  Orders:  -     XR CHEST (2 VW); Future  -     POCT Urinalysis no Micro  2. Cough  Comments:  Did not hear her cough while here but will continue monitoring chest x-ray was negative for any loculi's pneumonia or reason for pleuritic chest pain  Orders:  -     XR CHEST (2 VW); Future  3. Muscle strain of chest wall, initial encounter  Comments:  Stretching exercises were provided      Return if symptoms worsen or fail to improve. Subjective   SUBJECTIVE/OBJECTIVE:  Right lumbar back pain, and was feeling with swimming and has been like this for 3 or more weeks has been taking some naproxen and that helps some   She also reports having a stressful environment due to visitors over the holiday season. She denies any dysuria or frequency. No fevers chills or chest pain. Review of Systems   Constitutional: Negative for activity change, chills, diaphoresis, fatigue, fever and unexpected weight change. HENT: Negative for trouble swallowing and voice change. Eyes: Negative for visual disturbance. Respiratory: Negative for cough, chest tightness, shortness of breath and wheezing. Cardiovascular: Negative for chest pain, palpitations and leg swelling. Gastrointestinal: Negative for abdominal pain, blood in stool, constipation, diarrhea, nausea and vomiting. Endocrine: Negative for polydipsia, polyphagia and polyuria. Genitourinary: Negative for dysuria, enuresis, frequency and hematuria. Musculoskeletal: Positive for arthralgias and back pain.  Negative for gait problem, joint swelling, myalgias and neck stiffness. Skin: Negative for rash. Neurological: Negative for dizziness, seizures, syncope, facial asymmetry, weakness, light-headedness, numbness and headaches. Hematological: Does not bruise/bleed easily. Psychiatric/Behavioral: Negative for behavioral problems, confusion, hallucinations and suicidal ideas. The patient is not nervous/anxious. Objective   Physical Exam  Constitutional:       Appearance: Normal appearance. She is normal weight. Cardiovascular:      Rate and Rhythm: Normal rate and regular rhythm. Pulmonary:      Effort: Pulmonary effort is normal.      Breath sounds: Normal breath sounds. No wheezing. Chest:      Chest wall: No tenderness. Abdominal:      General: Abdomen is flat. Bowel sounds are normal.      Palpations: Abdomen is soft. Musculoskeletal:         General: Tenderness present. No swelling or deformity. Cervical back: Normal range of motion and neck supple. Comments:     THORACIC SPINE :    Inspection/Palpation -  No bony tenderness, right paraspinal tenderness, no palpable defect, no step offs appreciated, otherwise normal alignment and posture    Range of Motion -   full range of motion             Strength/Tone -   no atrophy, normal strength and tone. Diagnostic Tests -   Gonzalez forward bend negative and Beevors sign negative        LUMBAR SPINE:    Inspection/Palpation -  No bony tenderness, right paraspinal tenderness, no SI joint tenderness, no palpable defect, no step offs appreciated, otherwise normal alignment and posture    Range of Motion -   full range of motion                           Strength/Tone -   no atrophy, normal strength and tone.            Diagnostic Tests -   straight leg raise negative sitting/supine bilaterally     GAIT :   Guarded gait    SENSORY :   sensation intact to light touch and pinprick    REFLEXES :   normal symmetric patellar/achilles tendon reflexes    PULSES :   distal pulses intact Neurological:      Mental Status: She is alert. On this date 12/27/2021 I have spent 20 minutes reviewing previous notes, test results and face to face with the patient discussing the diagnosis and importance of compliance with the treatment plan as well as documenting on the day of the visit. An electronic signature was used to authenticate this note.         --SCOOTER Beard - CNP

## 2021-12-30 RX ORDER — RALOXIFENE HYDROCHLORIDE 60 MG/1
TABLET, FILM COATED ORAL
Qty: 90 TABLET | Refills: 0 | Status: SHIPPED
Start: 2021-12-30 | End: 2022-03-28

## 2022-01-03 ENCOUNTER — TELEPHONE (OUTPATIENT)
Dept: PRIMARY CARE CLINIC | Age: 82
End: 2022-01-03

## 2022-01-03 NOTE — TELEPHONE ENCOUNTER
SON SHAYY CALLED AND STATES PT STATES SHE IS NO LONGER IN ANY PAIN AND WANTS TO KNOW IF SHE CAN D/C MELOXICAM AND START TAKING HER ASPIRIN AGAIN. Aimee Miguel -[phone].

## 2022-01-11 ENCOUNTER — NURSE ONLY (OUTPATIENT)
Dept: PRIMARY CARE CLINIC | Age: 82
End: 2022-01-11

## 2022-01-11 DIAGNOSIS — E78.00 HYPERCHOLESTEROLEMIA: ICD-10-CM

## 2022-01-11 DIAGNOSIS — I10 PRIMARY HYPERTENSION: ICD-10-CM

## 2022-01-11 DIAGNOSIS — I10 ESSENTIAL HYPERTENSION: ICD-10-CM

## 2022-01-11 LAB
ALBUMIN SERPL-MCNC: 4.2 G/DL (ref 3.5–5.2)
ALP BLD-CCNC: 97 U/L (ref 35–104)
ALT SERPL-CCNC: 16 U/L (ref 0–32)
ANION GAP SERPL CALCULATED.3IONS-SCNC: 10 MMOL/L (ref 7–16)
AST SERPL-CCNC: 13 U/L (ref 0–31)
BASOPHILS ABSOLUTE: 0.07 E9/L (ref 0–0.2)
BASOPHILS RELATIVE PERCENT: 0.9 % (ref 0–2)
BILIRUB SERPL-MCNC: 0.5 MG/DL (ref 0–1.2)
BUN BLDV-MCNC: 19 MG/DL (ref 6–23)
CALCIUM SERPL-MCNC: 9.7 MG/DL (ref 8.6–10.2)
CHLORIDE BLD-SCNC: 101 MMOL/L (ref 98–107)
CHOLESTEROL, TOTAL: 182 MG/DL (ref 0–199)
CO2: 27 MMOL/L (ref 22–29)
CREAT SERPL-MCNC: 0.9 MG/DL (ref 0.5–1)
EOSINOPHILS ABSOLUTE: 1.71 E9/L (ref 0.05–0.5)
EOSINOPHILS RELATIVE PERCENT: 21 % (ref 0–6)
GFR AFRICAN AMERICAN: >60
GFR NON-AFRICAN AMERICAN: 60 ML/MIN/1.73
GLUCOSE FASTING: 95 MG/DL (ref 74–99)
HCT VFR BLD CALC: 41.3 % (ref 34–48)
HDLC SERPL-MCNC: 65 MG/DL
HEMOGLOBIN: 12.7 G/DL (ref 11.5–15.5)
IMMATURE GRANULOCYTES #: 0.02 E9/L
IMMATURE GRANULOCYTES %: 0.2 % (ref 0–5)
LDL CHOLESTEROL CALCULATED: 98 MG/DL (ref 0–99)
LYMPHOCYTES ABSOLUTE: 3.08 E9/L (ref 1.5–4)
LYMPHOCYTES RELATIVE PERCENT: 37.8 % (ref 20–42)
MCH RBC QN AUTO: 29.1 PG (ref 26–35)
MCHC RBC AUTO-ENTMCNC: 30.8 % (ref 32–34.5)
MCV RBC AUTO: 94.7 FL (ref 80–99.9)
MONOCYTES ABSOLUTE: 0.75 E9/L (ref 0.1–0.95)
MONOCYTES RELATIVE PERCENT: 9.2 % (ref 2–12)
NEUTROPHILS ABSOLUTE: 2.52 E9/L (ref 1.8–7.3)
NEUTROPHILS RELATIVE PERCENT: 30.9 % (ref 43–80)
PDW BLD-RTO: 13.5 FL (ref 11.5–15)
PLATELET # BLD: 281 E9/L (ref 130–450)
PMV BLD AUTO: 9.6 FL (ref 7–12)
POTASSIUM SERPL-SCNC: 4 MMOL/L (ref 3.5–5)
RBC # BLD: 4.36 E12/L (ref 3.5–5.5)
SODIUM BLD-SCNC: 138 MMOL/L (ref 132–146)
TOTAL PROTEIN: 7.5 G/DL (ref 6.4–8.3)
TRIGL SERPL-MCNC: 93 MG/DL (ref 0–149)
VLDLC SERPL CALC-MCNC: 19 MG/DL
WBC # BLD: 8.2 E9/L (ref 4.5–11.5)

## 2022-01-11 PROCEDURE — 99999 PR OFFICE/OUTPT VISIT,PROCEDURE ONLY: CPT | Performed by: NURSE PRACTITIONER

## 2022-01-14 ENCOUNTER — OFFICE VISIT (OUTPATIENT)
Dept: PRIMARY CARE CLINIC | Age: 82
End: 2022-01-14
Payer: MEDICARE

## 2022-01-14 VITALS
SYSTOLIC BLOOD PRESSURE: 90 MMHG | HEIGHT: 65 IN | HEART RATE: 92 BPM | BODY MASS INDEX: 22.19 KG/M2 | TEMPERATURE: 98.3 F | WEIGHT: 133.2 LBS | OXYGEN SATURATION: 98 % | RESPIRATION RATE: 18 BRPM | DIASTOLIC BLOOD PRESSURE: 60 MMHG

## 2022-01-14 DIAGNOSIS — Z13.29 SCREENING FOR THYROID DISORDER: ICD-10-CM

## 2022-01-14 DIAGNOSIS — G89.29 CHRONIC RIGHT-SIDED THORACIC BACK PAIN: Primary | ICD-10-CM

## 2022-01-14 DIAGNOSIS — I10 PRIMARY HYPERTENSION: ICD-10-CM

## 2022-01-14 DIAGNOSIS — M54.6 CHRONIC RIGHT-SIDED THORACIC BACK PAIN: Primary | ICD-10-CM

## 2022-01-14 DIAGNOSIS — S29.011A MUSCLE STRAIN OF CHEST WALL, INITIAL ENCOUNTER: ICD-10-CM

## 2022-01-14 DIAGNOSIS — I10 ESSENTIAL HYPERTENSION: ICD-10-CM

## 2022-01-14 DIAGNOSIS — E78.00 HYPERCHOLESTEROLEMIA: ICD-10-CM

## 2022-01-14 PROCEDURE — 99214 OFFICE O/P EST MOD 30 MIN: CPT | Performed by: NURSE PRACTITIONER

## 2022-01-14 RX ORDER — TIZANIDINE 2 MG/1
2 TABLET ORAL EVERY 6 HOURS PRN
COMMUNITY
End: 2022-01-14

## 2022-01-14 ASSESSMENT — ENCOUNTER SYMPTOMS
SHORTNESS OF BREATH: 0
WHEEZING: 0
VOMITING: 0
NAUSEA: 0
CHEST TIGHTNESS: 0
BACK PAIN: 1
ABDOMINAL PAIN: 0
COUGH: 0
CONSTIPATION: 0
VOICE CHANGE: 0
TROUBLE SWALLOWING: 0
DIARRHEA: 0
BLOOD IN STOOL: 0

## 2022-01-14 ASSESSMENT — PATIENT HEALTH QUESTIONNAIRE - PHQ9
SUM OF ALL RESPONSES TO PHQ QUESTIONS 1-9: 0
2. FEELING DOWN, DEPRESSED OR HOPELESS: 0
SUM OF ALL RESPONSES TO PHQ QUESTIONS 1-9: 0
1. LITTLE INTEREST OR PLEASURE IN DOING THINGS: 0
SUM OF ALL RESPONSES TO PHQ9 QUESTIONS 1 & 2: 0
SUM OF ALL RESPONSES TO PHQ QUESTIONS 1-9: 0
SUM OF ALL RESPONSES TO PHQ QUESTIONS 1-9: 0

## 2022-01-14 NOTE — PATIENT INSTRUCTIONS

## 2022-01-14 NOTE — PROGRESS NOTES
Dayana Ricardo (:  1940) is a 79 y.o. female,Established patient, here for evaluation of the following chief complaint(s):  Discuss Labs (follow up for back pain, review labs, no issues)         ASSESSMENT/PLAN:  1. Chronic right-sided thoracic back pain  2. Muscle strain of chest wall, initial encounter  3. Primary hypertension  4. Essential hypertension  -     CBC Auto Differential; Future  -     Comprehensive Metabolic Panel, Fasting; Future  -     Urinalysis; Future  5. Hypercholesterolemia  -     Lipid Panel; Future  6. BMI 22.0-22.9, adult  7. Screening for thyroid disorder  -     TSH without Reflex; Future      Return in about 6 months (around 2022) for Routine Visit with Labs. Subjective   SUBJECTIVE/OBJECTIVE:  This is a very pleasant 79-year-old white female who is following up following back pain which seems to be bilateral paraspinal mid thoracic is now localized to just on the left side base of the trapezius she still has not gotten into the pool yet she also presents today for evaluation and management of chronic medical problems. Current medication list reviewed. The patient is tolerating all medications well without adverse events or known side effects. The patient does understand the risk and benefits of the prescribed medications. The patient is not up-to-date on all age-appropriate wellness issues. Patient denies any reccent hospitalizations or ER visit. No Acute Complaints reported: Pain is much better      LAST  VISIT  Right lumbar back pain, and was feeling with swimming and has been like this for 3 or more weeks has been taking some naproxen and that helps some   She also reports having a stressful environment due to visitors over the holiday season. She denies any dysuria or frequency. No fevers chills or chest pain. Review of Systems   Constitutional: Negative for activity change, chills, diaphoresis, fatigue, fever and unexpected weight change.    HENT: Negative for trouble swallowing and voice change. Eyes: Negative for visual disturbance. Respiratory: Negative for cough, chest tightness, shortness of breath and wheezing. Cardiovascular: Negative for chest pain, palpitations and leg swelling. Gastrointestinal: Negative for abdominal pain, blood in stool, constipation, diarrhea, nausea and vomiting. Endocrine: Negative for polydipsia, polyphagia and polyuria. Genitourinary: Negative for dysuria, enuresis, frequency and hematuria. Musculoskeletal: Positive for arthralgias and back pain. Negative for gait problem, joint swelling, myalgias and neck stiffness. Skin: Negative for rash. Neurological: Negative for dizziness, seizures, syncope, facial asymmetry, weakness, light-headedness, numbness and headaches. Hematological: Does not bruise/bleed easily. Psychiatric/Behavioral: Negative for behavioral problems, confusion, hallucinations and suicidal ideas. The patient is not nervous/anxious. Objective   Physical Exam  Constitutional:       Appearance: Normal appearance. She is normal weight. Cardiovascular:      Rate and Rhythm: Normal rate and regular rhythm. Pulmonary:      Effort: Pulmonary effort is normal.      Breath sounds: Normal breath sounds. No wheezing. Chest:      Chest wall: No tenderness. Abdominal:      General: Abdomen is flat. Bowel sounds are normal.      Palpations: Abdomen is soft. Musculoskeletal:         General: Tenderness present. No swelling or deformity. Cervical back: Normal range of motion and neck supple. Comments:     THORACIC SPINE :    Inspection/Palpation -  No bony tenderness, no paraspinal tenderness, no palpable defect, no step offs appreciated, otherwise normal alignment and posture    Range of Motion -   full range of motion             Strength/Tone -   no atrophy, normal strength and tone.          Diagnostic Tests -   Gonzalez forward bend negative and Beevors sign negative        LUMBAR SPINE:    Inspection/Palpation -  No bony tenderness, right paraspinal tenderness localized to the Base of the trapezious, no SI joint tenderness, no palpable defect, no step offs appreciated, otherwise normal alignment and posture    Range of Motion -   full range of motion                           Strength/Tone -   no atrophy, normal strength and tone. Diagnostic Tests -   straight leg raise negative sitting/supine bilaterally     GAIT :   Guarded gait    SENSORY :   sensation intact to light touch and pinprick    REFLEXES :   normal symmetric patellar/achilles tendon reflexes    PULSES :   distal pulses intact         Neurological:      Mental Status: She is alert. On this date 12/27/2021 I have spent 20 minutes reviewing previous notes, test results and face to face with the patient discussing the diagnosis and importance of compliance with the treatment plan as well as documenting on the day of the visit. An electronic signature was used to authenticate this note.         --SCOOTER Joy - CNP

## 2022-01-24 RX ORDER — AMLODIPINE BESYLATE 5 MG/1
TABLET ORAL
Qty: 90 TABLET | Refills: 1 | Status: SHIPPED
Start: 2022-01-24 | End: 2022-08-18 | Stop reason: SDUPTHER

## 2022-01-24 RX ORDER — ROSUVASTATIN CALCIUM 10 MG/1
TABLET, COATED ORAL
Qty: 90 TABLET | Refills: 1 | Status: SHIPPED | OUTPATIENT
Start: 2022-01-24

## 2022-01-28 ENCOUNTER — TELEPHONE (OUTPATIENT)
Dept: PRIMARY CARE CLINIC | Age: 82
End: 2022-01-28

## 2022-01-28 NOTE — TELEPHONE ENCOUNTER
----- Message from Elyse [place] sent at 1/21/2022 11:04 AM EST -----  Subject: Message to Provider    QUESTIONS  Information for Provider? Patient would like to know if she should   continue taking the medication rosuvastatin (CRESTOR) 10 MG tablet. Please   call patient to advise.  ---------------------------------------------------------------------------  --------------  CALL BACK INFO  What is the best way for the office to contact you? OK to leave message on   voicemail  Preferred Call Back Phone Number? [phone]  ---------------------------------------------------------------------------  --------------  SCRIPT ANSWERS  Relationship to Patient?  Self

## 2022-01-28 NOTE — TELEPHONE ENCOUNTER
I just reviewed several messages and past visits and I do not see any conversation about stopping the Crestor is the reason she wants to?

## 2022-03-01 RX ORDER — LOSARTAN POTASSIUM 100 MG/1
TABLET ORAL
Qty: 90 TABLET | Refills: 1 | Status: SHIPPED
Start: 2022-03-01 | End: 2022-09-27 | Stop reason: SDUPTHER

## 2022-03-18 RX ORDER — CYANOCOBALAMIN 1000 UG/ML
INJECTION INTRAMUSCULAR; SUBCUTANEOUS
Qty: 3 EACH | Refills: 1 | Status: SHIPPED
Start: 2022-03-18 | End: 2022-03-22

## 2022-03-18 NOTE — TELEPHONE ENCOUNTER
Patients last appointment 1/14/2022.   Patients next scheduled appointment   Future Appointments   Date Time Provider Rajwinder Castro   7/8/2022  8:30 AM Ritu Martinez [address] [facility]   7/15/2022 10:30 AM SCOOTER Carvalho CNP [facility]

## 2022-03-22 RX ORDER — CYANOCOBALAMIN 1000 UG/ML
INJECTION INTRAMUSCULAR; SUBCUTANEOUS
Qty: 3 ML | Refills: 2 | Status: SHIPPED
Start: 2022-03-22 | End: 2022-08-29

## 2022-03-22 NOTE — TELEPHONE ENCOUNTER
Patients last appointment 1/14/2022. Patients next scheduled appointment   Future Appointments   Date Time Provider Rajwinder Castro   7/8/2022  8:30 AM SCHEDULE, RUBI BRIONES [address] [place] [address]   7/15/2022 10:30 AM SCOOTER Valentin - CNP [facility]      Hi [employer], it seems this med did not went thorough because I called the pt and the pharmacy, so I am pending it again.     Thanks

## 2022-03-28 RX ORDER — RALOXIFENE HYDROCHLORIDE 60 MG/1
TABLET, FILM COATED ORAL
Qty: 90 TABLET | Refills: 1 | Status: SHIPPED
Start: 2022-03-28 | End: 2022-03-29 | Stop reason: SDUPTHER

## 2022-03-29 RX ORDER — RALOXIFENE HYDROCHLORIDE 60 MG/1
TABLET, FILM COATED ORAL
Qty: 90 TABLET | Refills: 0 | OUTPATIENT
Start: 2022-03-29

## 2022-03-29 NOTE — TELEPHONE ENCOUNTER
Patients last appointment 1/14/2022.   Patients next scheduled appointment   Future Appointments   Date Time Provider Rajwinder Castro   7/8/2022  8:30 AM SCHEDULE, RUBI BRIONES [address] [place] [address]   7/15/2022 10:30 AM SCOOTER Catsillo - CNP [facility]      She does not use [facility], She uses [facility], that is why I am sending this prescription again (I saw you did a refill yesterday)

## 2022-03-30 RX ORDER — RALOXIFENE HYDROCHLORIDE 60 MG/1
60 TABLET, FILM COATED ORAL DAILY
Qty: 90 TABLET | Refills: 1 | Status: SHIPPED
Start: 2022-03-30 | End: 2022-09-14

## 2022-04-06 RX ORDER — OMEPRAZOLE 40 MG/1
40 CAPSULE, DELAYED RELEASE ORAL DAILY
Qty: 90 CAPSULE | Refills: 1 | Status: SHIPPED | OUTPATIENT
Start: 2022-04-06

## 2022-04-06 NOTE — TELEPHONE ENCOUNTER
THIS PT IS WONDERING IF SHE HAS TO KEEP TAKING THIS MEDICATION I AM PENDING FOR YOU OR NOT. IF NOT, YOU CAN GO AHEAD AND REFUSE IT. THANK YOU.    ___________________________________    Patients last appointment 1/14/2022.   Patients next scheduled appointment   Future Appointments   Date Time Provider Rajwinder Castro   7/8/2022  8:30 AM Mary Herrera [address] [facility]   7/15/2022 10:30 AM SCOOTER Fraser - CNP [facility]

## 2022-05-23 ENCOUNTER — TELEPHONE (OUTPATIENT)
Dept: PRIMARY CARE CLINIC | Age: 82
End: 2022-05-23

## 2022-05-23 ENCOUNTER — OFFICE VISIT (OUTPATIENT)
Dept: PRIMARY CARE CLINIC | Age: 82
End: 2022-05-23
Payer: MEDICARE

## 2022-05-23 VITALS
BODY MASS INDEX: 22.13 KG/M2 | SYSTOLIC BLOOD PRESSURE: 116 MMHG | WEIGHT: 132.8 LBS | HEIGHT: 65 IN | DIASTOLIC BLOOD PRESSURE: 72 MMHG | HEART RATE: 89 BPM | TEMPERATURE: 97.5 F | OXYGEN SATURATION: 97 %

## 2022-05-23 DIAGNOSIS — K21.9 GASTROESOPHAGEAL REFLUX DISEASE WITHOUT ESOPHAGITIS: ICD-10-CM

## 2022-05-23 DIAGNOSIS — K57.30 DIVERTICULAR DISEASE OF COLON: ICD-10-CM

## 2022-05-23 DIAGNOSIS — E53.9 VITAMIN B DEFICIENCY: ICD-10-CM

## 2022-05-23 DIAGNOSIS — M17.11 PRIMARY OSTEOARTHRITIS OF RIGHT KNEE: ICD-10-CM

## 2022-05-23 DIAGNOSIS — K44.9 HIATAL HERNIA: ICD-10-CM

## 2022-05-23 DIAGNOSIS — E78.00 HYPERCHOLESTEROLEMIA: ICD-10-CM

## 2022-05-23 DIAGNOSIS — M17.12 PRIMARY OSTEOARTHRITIS OF LEFT KNEE: ICD-10-CM

## 2022-05-23 DIAGNOSIS — I10 PRIMARY HYPERTENSION: Primary | ICD-10-CM

## 2022-05-23 DIAGNOSIS — Z13.29 SCREENING FOR THYROID DISORDER: ICD-10-CM

## 2022-05-23 DIAGNOSIS — G45.9 TRANSIENT ISCHEMIC ATTACK: ICD-10-CM

## 2022-05-23 PROCEDURE — 99214 OFFICE O/P EST MOD 30 MIN: CPT | Performed by: NURSE PRACTITIONER

## 2022-05-23 RX ORDER — NAPROXEN 500 MG/1
500 TABLET ORAL 2 TIMES DAILY PRN
COMMUNITY
End: 2022-08-08

## 2022-05-23 RX ORDER — VIT C/B6/B5/MAGNESIUM/HERB 173 50-5-6-5MG
500 CAPSULE ORAL PRN
COMMUNITY

## 2022-05-23 ASSESSMENT — ENCOUNTER SYMPTOMS
SHORTNESS OF BREATH: 0
VOICE CHANGE: 0
CHEST TIGHTNESS: 0
CONSTIPATION: 0
BLOOD IN STOOL: 0
COUGH: 0
BACK PAIN: 0
TROUBLE SWALLOWING: 0
WHEEZING: 0
ABDOMINAL PAIN: 0
VOMITING: 0
DIARRHEA: 0
NAUSEA: 0

## 2022-05-23 NOTE — PATIENT INSTRUCTIONS
Patient Education        Diverticulosis: Care Instructions  Your Care Instructions  In diverticulosis, pouches called diverticula form in the wall of the large intestine (colon). The pouches do not cause any pain or other symptoms. Most people who have diverticulosis do not know they have it. But the pouches sometimes bleed, and if they become infected, they can cause pain and othersymptoms. When this happens, it is called diverticulitis. Diverticula form when pressure pushes the wall of the colon outward at certainweak points. A diet that is too low in fiber can cause diverticula. Follow-up care is a key part of your treatment and safety. Be sure to make and go to all appointments, and call your doctor if you are having problems. It's also a good idea to know your test results and keep alist of the medicines you take. How can you care for yourself at home?  Include fruits, leafy green vegetables, beans, and whole grains in your diet each day. These foods are high in fiber.  Take a fiber supplement, such as Citrucel or Metamucil, every day if needed. Read and follow all instructions on the label.  Drink plenty of fluids. If you have kidney, heart, or liver disease and have to limit fluids, talk with your doctor before you increase the amount of fluids you drink.  Get at least 30 minutes of exercise on most days of the week. Walking is a good choice. You also may want to do other activities, such as running, swimming, cycling, or playing tennis or team sports.  Cut out foods that cause gas, pain, or other symptoms. When should you call for help?    Call your doctor now or seek immediate medical care if:     You have belly pain.      You pass maroon or very bloody stools.      You have a fever.      You have nausea and vomiting.      You have unusual changes in your bowel movements or abdominal swelling.      You have burning pain when you urinate.      You have abnormal vaginal discharge.      You have shoulder pain.      You have cramping pain that does not get better when you have a bowel movement or pass gas.      You pass gas or stool from your urethra while urinating. Watch closely for changes in your health, and be sure to contact your doctor ifyou have any problems. Where can you learn more? Go to https://chpepicewjorge.Skimlinks. org and sign in to your Zeppelin account. Enter M122 in the RentPost box to learn more about \"Diverticulosis: Care Instructions. \"     If you do not have an account, please click on the \"Sign Up Now\" link. Current as of: September 8, 2021               Content Version: 13.2  © 2006-2022 CoursePeer. Care instructions adapted under license by [email or web address] ([facility]). If you have questions about a medical condition or this instruction, always ask your healthcare professional. [place] [address] any warranty or liability for your use of this information. Patient Education        Learning About Diverticulosis and Diverticulitis  What are diverticulosis and diverticulitis? In diverticulosis and diverticulitis, pouches called diverticula form in thewall of the large intestine, or colon.  In diverticulosis, the pouches do not cause any pain or other symptoms.  In diverticulitis, the pouches get inflamed or infected and cause symptoms. Doctors aren't sure what causes these pouches in the colon. But they think that a low-fiber diet may play a role. Without fiber to add bulk to the stool, the colon has to work harder than normal to push the stool forward. The pressurefrom this may cause pouches to form in weak spots along the colon. Some people with diverticulosis get diverticulitis. But experts don't know whythis happens. What are the symptoms?  In diverticulosis, most people don't have symptoms. But pouches sometimes bleed.  In diverticulitis, symptoms may last from a few hours to a week or more.  They include:  ? Belly pain. This is usually in the lower left side. It is sometimes worse when you move. This is the most common symptom. ? Fever and chills. ? Bloating and gas. ? Diarrhea or constipation. ? Nausea and sometimes vomiting.  ? Not feeling like eating. How can you prevent diverticulitis? You may be able to lower your chance of getting diverticulitis. You can do thisby taking steps to prevent constipation.  Eat fruits, vegetables, beans, and whole grains every day. These foods are high in fiber.  Drink plenty of fluids. If you have kidney, heart, or liver disease and have to limit fluids, talk with your doctor before you increase the amount of fluids you drink.  Get at least 30 minutes of exercise on most days of the week. Walking is a good choice. You also may want to do other activities, such as running, swimming, cycling, or playing tennis or team sports.  Take a fiber supplement, such as Citrucel or Metamucil, every day if needed. Read and follow all instructions on the label.  Schedule time each day for a bowel movement. Having a daily routine may help. Take your time and do not strain when having a bowel movement. Some people avoid nuts, seeds, berries, and popcorn. They believe that these foods might get trapped in the diverticula and cause pain. But there is noproof that these foods cause diverticulitis or make it worse. How are these problems treated?  The best way to treat diverticulosis is to avoid constipation.  Treatment for diverticulitis includes antibiotics. It often includes a change in your diet. You may need only liquids at first. Your doctor may suggest pain medicines for pain or belly cramps. In some cases, surgery may be needed. Follow-up care is a key part of your treatment and safety. Be sure to make and go to all appointments, and call your doctor if you are having problems.  It's also a good idea to know your test results and keep alist of the medicines you take.  Where can you learn more? Go to https://chpepiceweb.Campus [email or web address] org and sign in to your Artsy account. Enter D384 in the appCREARhire box to learn more about \"Learning About Diverticulosis and Diverticulitis. \"     If you do not have an account, please click on the \"Sign Up Now\" link. Current as of: September 8, 2021               Content Version: 13.2  © 2006-2022 Rollerwall. Care instructions adapted under license by [facility] ([facility]). If you have questions about a medical condition or this instruction, always ask your healthcare professional. [place] [address] any warranty or liability for your use of this information. Patient Education        Upper GI Endoscopy: Before Your Procedure  What is an upper GI endoscopy? An upper gastrointestinal (or GI) endoscopy is a test that allows your doctor to look at the inside of your esophagus, stomach, and the first part of your small intestine, called the duodenum. The esophagus is the tube that carries food to your stomach. The doctor uses a thin, lighted tube that bends. It iscalled an endoscope, or scope. The doctor puts the tip of the scope in your mouth and gently moves it down your throat. The scope is a flexible video camera. The doctor looks at a monitor (like a TV set or a computer screen) as he or she moves the scope. A doctor may do this procedure to look for ulcers, tumors, infection, or bleeding. It also can be used to look for signs of acid backing up into your esophagus. This is called gastroesophageal reflux disease, or GERD. The doctor can use the scope to take a sample of tissue for study (a biopsy). The doctoralso can use the scope to take out growths or stop bleeding. Follow-up care is a key part of your treatment and safety. Be sure to make and go to all appointments, and call your doctor if you are having problems.  It's also a good idea to know your test results and keep alist of the instructions exactly about when to stop eating and drinking. If you don't, your procedure may be canceled. If your doctor told you to take your medicines on the day of the procedure, take them with only a sip of water.      Take a bath or shower before you come in for your procedure. Do not apply lotions, perfumes, deodorants, or nail polish.      Take off all jewelry and piercings. And take out contact lenses, if you wear them. At the hospital or surgery center    Bring a picture ID.      The test may take 15 to 30 minutes.      The doctor may spray medicine on the back of your throat to numb it. You also will get medicine to prevent pain and to relax you.      You will lie on your left side. The doctor will put the scope in your mouth and toward the back of your throat. The doctor will tell you when to swallow. This helps the scope move down your throat. You will be able to breathe normally. The doctor will move the scope down your esophagus into your stomach. The doctor also may look at the duodenum.      If your doctor wants to take a sample of tissue for a biopsy, he or she may use small surgical tools, which are put into the scope, to cut off some tissue. You will not feel a biopsy, if one is taken. The doctor also can use the tools to stop bleeding or to do other treatments, if needed.      You will stay at the hospital or surgery center for 1 to 2 hours until the medicine you were given wears off. What happens after an upper GI endoscopy?  After the test, you may belch and feel bloated for a while.      You may have a tickling, dry throat or mouth. You may feel a bit hoarse, and you may have a mild sore throat. These symptoms may last several days.  Throat lozenges and warm saltwater gargles can help relieve the throat symptoms.      Ask your doctor when you can drive again.      Your doctor will tell you when you can go back to your usual diet and activities.      Don't drink alcohol for 12 to 24 hours after the test.   When should you call your doctor?  You have questions or concerns.      You don't understand how to prepare for your procedure.      You become ill before the procedure (such as fever, flu, or a cold).      You need to reschedule or have changed your mind about having the procedure. Where can you learn more? Go to https://DBA Grouppepiceweb.Prevoty. org and sign in to your Epiphany Inc account. Enter P790 in the IPtronics A/S box to learn more about \"Upper GI Endoscopy: Before Your Procedure. \"     If you do not have an account, please click on the \"Sign Up Now\" link. Current as of: September 8, 2021               Content Version: 13.2  © 2006-2022 Healthwise, ViaCLIX. Care instructions adapted under license by [facility] ([facility]). If you have questions about a medical condition or this instruction, always ask your healthcare professional. [place] [address] any warranty or liability for your use of this information. Patient Education        Gastroesophageal Reflux Disease (GERD): Care Instructions  Overview     Gastroesophageal reflux disease (GERD) is the backward flow of stomach acid into the esophagus. The esophagus is the tube that leads from your throat to your stomach. A one-way valve prevents the stomach acid from backing up into this tube. But when you have GERD, this valve does not close tightly enough. This can also cause pain and swelling in your esophagus. (This is calledesophagitis.)  If you have mild GERD symptoms including heartburn, you may be able to control the problem with antacids or over-the-counter medicine. You can also make lifestyle changes to help reduce your symptoms. These include changing yourdiet and eating habits, such as not eating late at night and losing weight. Follow-up care is a key part of your treatment and safety.  Be sure to make and go to all appointments, and call your doctor if you are having problems. It's also a good idea to know your test results and keep alist of the medicines you take. How can you care for yourself at home?  Take your medicines exactly as prescribed. Call your doctor if you think you are having a problem with your medicine.  Your doctor may recommend over-the-counter medicine. For mild or occasional indigestion, antacids, such as Tums, Gaviscon, Mylanta, or Maalox, may help. Your doctor also may recommend over-the-counter acid reducers, such as famotidine (Pepcid AC), cimetidine (Tagamet HB), or omeprazole (Prilosec). Read and follow all instructions on the label. If you use these medicines often, talk with your doctor.  Change your eating habits. ? It's best to eat several small meals instead of two or three large meals. ? After you eat, wait 2 to 3 hours before you lie down. ? Avoid foods that make your symptoms worse. These may include chocolate, mint, alcohol, pepper, spicy foods, high-fat foods, or drinks with caffeine in them, such as tea, coffee, killian, or energy drinks. If your symptoms are worse after you eat a certain food, you may want to stop eating it to see if your symptoms get better.  Do not smoke or chew tobacco. Smoking can make GERD worse. If you need help quitting, talk to your doctor about stop-smoking programs and medicines. These can increase your chances of quitting for good.  If you have GERD symptoms at night, raise the head of your bed 6 to 8 inches by putting the frame on blocks or placing a foam wedge under the head of your mattress. (Adding extra pillows does not work.)   Do not wear tight clothing around your middle.  Lose weight if you need to. Losing just 5 to 10 pounds can help. When should you call for help? Call your doctor now or seek immediate medical care if:     You have new or different belly pain.      Your stools are black and tarlike or have streaks of blood.    Watch closely for changes in your health, and be sure to contact your doctor if:     Your symptoms have not improved after 2 days.      Food seems to catch in your throat or chest.   Where can you learn more? Go to https://chpepiceweb.TotSpot. org and sign in to your Gizmox account. Enter A908 in the [facility] box to learn more about \"Gastroesophageal Reflux Disease (GERD): Care Instructions. \"     If you do not have an account, please click on the \"Sign Up Now\" link. Current as of: September 8, 2021               Content Version: 13.2  © 3667-1323 Timely Network. Care instructions adapted under license by [facility] ([facility]). If you have questions about a medical condition or this instruction, always ask your healthcare professional. [address] any warranty or liability for your use of this information. Patient Education        Osteoarthritis: Care Instructions  Your Care Instructions     Arthritis is a common health problem in which the joints are inflamed. There are several kinds of arthritis. Osteoarthritis is caused by a breakdown of cartilage, the hard, thick tissue that cushions the joints. It causes pain, stiffness, and swelling, often in the spine, fingers, hips, and knees. Osteoarthritis can happen at any age, but it is most common in older people. Osteoarthritis never goes away completely, but it can be controlled. Medicine and home treatment can reduce the pain and prevent the arthritis from getting worse. Follow-up care is a key part of your treatment and safety. Be sure to make and go to all appointments, and call your doctor if you are having problems. It's also a good idea to know your test results and keep a list of the medicines you take. How can you care for yourself at home? · Take a warm shower or bath in the morning to relieve stiffness. Avoid sitting still afterwards. · If the joint is not swollen, use moist heat, like a warm, damp towel, for 20 to 30 minutes, 2 or 3 times a day. Do not use heat on a swollen joint. · If the joint is swollen, use ice or cold packs for 10 to 20 minutes, once an hour. Cold will help relieve pain and reduce inflammation. Put a thin cloth between the ice and your skin. · To prevent stiffness, gently move the joint through its full range of motion several times a day. · If the joint hurts, avoid activities that put a strain on it for a few days. Take rest breaks throughout the day. · Get regular exercise. Walking, swimming, yoga, biking, tu chi, and water aerobics are good exercises that are gentle on the joints. · Reach and stay at a healthy weight. If you need to lose or maintain weight, regular exercise and a healthy diet will help. Extra weight can strain the joints, especially the knees and hips, and make the pain worse. Losing even a few pounds may help. · Take pain medicines exactly as directed. ? If the doctor gave you a prescription medicine for pain, take it as prescribed. ? If you are not taking a prescription pain medicine, ask your doctor if you can take an over-the-counter medicine. When should you call for help? Call your doctor now or seek immediate medical care if:    · The pain is so bad that you cannot use the joint.     · You have sudden back pain with weakness in your legs or loss of bowel or bladder control.     · Your stools are black and tarlike or have streaks of blood.     · You have severe pain and swelling in more than one joint. Watch closely for changes in your health, and be sure to contact your doctor if:    · You have side effects from the medicines, like belly pain, ongoing heartburn, or nausea.     · Joint pain continues for more than 6 weeks, and home treatment is not helping. Where can you learn more? Go to https://Secure Computingamber."Enfold, Inc.". org and sign in to your Control de Pacientes account. Enter H614 in the BlossomandTwigs.com box to learn more about \"Osteoarthritis: Care Instructions. \"     If you do not have an account, please click on the \"Sign Up Now\" link. Current as of: August 5, 2020               Content Version: 12.9  © 2006-2021 NG Advantage. Care instructions adapted under license by [facility] [employer] [facility] ([facility]). If you have questions about a medical condition or this instruction, always ask your healthcare professional. [address] any warranty or liability for your use of this information. Patient Education        Learning About Transient Ischemic Attack (TIA)  What is a TIA? A transient ischemic attack (TIA) means that the blood flow to a part of the brain is blocked for a short time. A TIA causes the same symptoms as a stroke. But unlike a stroke, a TIA does not cause lasting brain damage. A TIA is a signthat a stroke may happen in the future. During a TIA, the blood supply to part of the brain is reduced or blocked. This may be caused by a blood clot in a blood vessel. When blood flow is blocked, the brain cells in that area are affected within seconds. This causes symptoms in parts of the body controlled by those brain cells. Symptoms can last for atleast a few minutes. When the blood flow returns, the symptoms go away. What happens after a TIA? A TIA doesn't cause lasting problems. But it is a serious warning sign of a possible stroke in the future. You can do a lot to lower your chance of havinga stroke. You and your doctor can work together to decide how to lower your risk ofstroke. Medicines and a heart-healthy lifestyle can help. What are the symptoms? Symptoms of a TIA are the same as symptoms of a stroke. But symptoms of a TIAdon't last very long. They may go away in a few minutes. If you have any of these symptoms, call 911 or other emergency services right away. Symptoms include:   Sudden numbness, tingling, weakness, or loss of movement in your face, arm, or leg, especially on only one side of your body.  Sudden vision changes.    Sudden trouble speaking.  Sudden confusion or trouble understanding simple statements.  Sudden problems with walking or balance. Ask your family, friends, and coworkers to learn the signs of a TIA and stroke. They may notice these signs before you do. Make sure they know to call 911 if these signs appear. How is a TIA treated? If you've had a TIA, you need to see a doctor right away. After a TIA, you are at risk for a stroke. So you may stay in the hospital. You may have more testsand treatment. Treatment for TIA is focused on preventing a stroke. A heart-healthy lifestyle and medicine can help. This lifestyle includes eating healthy, being active, staying at a healthy weight, and not smoking. You may take medicine to prevent blood clots, lower blood pressure, lower cholesterol, and manage other health problems. Some people have surgery or a procedure to widen narrowed carotidarteries that supply blood to the brain. How can you help prevent another TIA and a stroke? Here are some ways to reduce your risk of having another TIA and a stroke.  Work with your doctor to manage any health problems that raise your risk. These health problems include atrial fibrillation, diabetes, high blood pressure, and high cholesterol.  Take your medicine exactly as prescribed.  Have a heart-healthy lifestyle. ? Do not smoke or allow others to smoke around you. If you need help quitting, talk to your doctor. ? Limit alcohol to 2 drinks a day for men and 1 drink a day for women. ? Stay at a healthy weight. Lose weight if you need to.  ? Be active. Ask your doctor what type and level of activity are safe for you.  ? Eat heart-healthy foods. These include vegetables, fruits, nuts, beans, lean meat, fish, and whole grains. Limit sodium and sugar. ? If you think you may have a problem with alcohol or drug use, talk to your doctor. Follow-up care is a key part of your treatment and safety.  Be sure to make and go to all appointments, and call your doctor if you are having problems. It's also a good idea to know your test results and keep alist of the medicines you take. When should you call for help? Call 911 anytime you think you may need emergency care. For example, call if:     You have symptoms of a stroke. These may include:  ? Sudden numbness, tingling, weakness, or loss of movement in your face, arm, or leg, especially on only one side of your body. ? Sudden vision changes. ? Sudden trouble speaking. ? Sudden confusion or trouble understanding simple statements. ? Sudden problems with walking or balance. ? A sudden, severe headache that is different from past headaches. Call 911 even if these symptoms go away in a few minutes.      You feel like you are having another TIA. Watch closely for changes in your health, and be sure to contact your doctor ifyou have any problems. Where can you learn more? Go to https://Comenta TV.Prescription Eyewear. org and sign in to your Flat World Education account. Enter N370 in the AdEx Media box to learn more about \"Learning About Transient Ischemic Attack (TIA). \"     If you do not have an account, please click on the \"Sign Up Now\" link. Current as of: July 6, 2021               Content Version: 13.2  © 2006-2022 Healthwise, Incorporated. Care instructions adapted under license by [facility] ([facility]). If you have questions about a medical condition or this instruction, always ask your healthcare professional. [place] [address] any warranty or liability for your use of this information.

## 2022-05-23 NOTE — PROGRESS NOTES
Tess Parents : 1940 Sex: female  Age: 79 y.o. Chief Complaint   Patient presents with    Follow-Up from Hospital     stomach issues possible ulcer, back was hurting, wasnt eating well, pt has hernia        Assessment and Plan:  Primary hypertension  Hypercholesterolemia  Screening for thyroid disorder  Gastroesophageal reflux disease without esophagitis  Vitamin B deficiency  Transient ischemic attack  Primary osteoarthritis of left knee  Primary osteoarthritis of right knee  Diverticular disease of colon  Hiatal hernia       Return in about 7 weeks (around 2022). Educational materials  printed for patient's review and were included in patient instructions on her After Visit Summary and given to patient at the end of visit. Counseled regarding above diagnosis, including possible risks and complications,  especially if left uncontrolled. Counseled regarding the possible side effects, risks, benefits and alternatives to treatment; patient and/or guardian verbalizes understanding, agrees, feels comfortable with and wishes to proceed with above treatment plan. Advised patient to call with any new medication issues, and read all Rx info from pharmacy to assure aware of all possible risks and side effects of medication before taking. Reviewed age and gender appropriate health screening exams and vaccinations. Advised patient regarding importance of keeping up with recommended health maintenance and to schedule as soon as possible if overdue, as this is important in assessing for undiagnosed pathology, especially cancer, as well as protecting against potentially harmful/life threatening disease. Patient verbalizes understanding and agrees with above counseling, assessment and plan. All questions answered.     On 22 I have spent 30 reviewing previous notes, test results and face to face with the patient discussing the diagnosis and importance of compliance with the treatment plan as well as documenting on the day of the visit. Educational materials exercises printed for patient's review and were included in patient instructions on their After Visit Summary and given to patient at the end of visit.      USPTF:    (  ) Colorectal Cancer: Screening --Adults aged 48 to 76 years  Grade: A (Recommended) recommends screening for colorectal cancer starting at age 48 years and continuing until age 76 years. (B/P 116/72) High Blood Pressure: Screening and Home Monitoring -- Adults  Grade: A (Recommended) recommends screening for high blood pressure in ages 25 years or older. obtain measurements outside of the clinical setting for diagnostic confirmation before starting treatment. Annual screening for adults aged 36 years or older or those who are at increased risk for blood pressure    (, TRI 93, LDL 98)  Lipid Disorders in Adults: Screening -- Men 28 and Older  Grade: A (Recommended) recommends screening men aged 28 and older for lipid disorders. (Non Drinker) Alcohol Misuse: Screening and Behavioral Counseling Interventions in Primary Care -- Adults  Grade: B (Recommended) recommends that clinicians screen adults aged 25 years or older for alcohol misuse and provide persons engaged in risky or hazardous drinking with brief behavioral counseling interventions to reduce alcohol misuse. (BGL 79) Abnormal Blood Glucose and Type 2 Diabetes Mellitus: Screening -- Adults aged 36 to 79 years who are overweight or obese Grade: B (Recommended)    (BMI 22.10)  Obesity: Screening for and Management of-- All Adults  Grade: B(Recommended) recommends screening all adults for obesity. Clinicians should offer or refer patients with a body mass index (BMI) of 30 kg/m2 or higher to intensive, multicomponent behavioral interventions.          (Not a fall risk)  Fall Prevention -- Exercise/Physical Therapy: Community-dwelling Adults 72 Years or Older, Increased Risk for Falls   Grade: B (Recommended) recommends exercise or physical therapy to prevent falls in community-dwelling adults aged 72 years or older who are at increased risk for falls. (No new symptoms noted or reported today)  Depression: Screening -- General adult population, including pregnant and postpartum women  Grade: B(Recommended) recommends screening for depression in the general adult population,  Screening should be implemented with adequate systems in place to ensure accurate diagnosis, effective treatment, and appropriate follow-up. (  ) Glaucoma: Screening - Adults and Diabetic Eye Exam     (  ) Vitamin D Deficiency: Screening --       (TSH 2.64) Thyroid Dysfunction: Screening --      (  ) Cervical Cancer: Screening -- Women 21 to 72 (Pap Smear) or 30-65 (in combo with HPV testing)  Grade: A(Recommended)    (  ) Breast Cancer: Screening with Mammography-- Women aged 48 to 76 years  Grade: B (Recommended) recommends biennial screening mammography for women aged 48 to 76 years. (  ) Osteoporosis: Screening -- Women 65+ and Younger Women at Increased Risk  Grade: B(Recommended) recommends screening for osteoporosis in women aged 72 years or older and in younger women whose fracture risk is equal to or greater than that of a 60-year-old white woman who has no additional risk factors. HPI    CHRONIC CONDITION:    DM: Mild intensity but controlled on * , remains without symptoms, no weight loss, no increase in thirst, or urination and no low blood sugar incidents. No reports of poor circulation or issues with feet ulceration or injury. Is better when compliant with diet and medications. Vision Exam Up to Date:                             Y   Currently on an ACE ARB:                                  Y  Most Recent Hgb A1c <7: Y   Microalbumin Test Reviewed:                       Y   Foot Exam Up To Date:                                Y   Flu Shot Up To Date: Y   Has patient been seen by Neurology           N   Currently on a Statin                                    Y       HTN: Stable hypertension, controlled on    losartan (COZAAR) 100 MG tablet, TAKE 1 TABLET ONCE DAILY, Disp: 90 tablet, Rfl: 1   amLODIPine (NORVASC) 5 MG tablet, TAKE 1 TABLET DAILY, Disp: 90 tablet, Rfl: 1   hydroCHLOROthiazide (HYDRODIURIL) 25 MG tablet, Take 1 tablet by mouth daily, Disp: 90 tablet, Rfl: 1    vitamin E 400 UNIT capsule, Take by mouth, Disp: , Rfl:    aspirin 81 MG EC tablet, Take 81 mg by mouth daily, Disp: , Rfl: , remains at a mild intensity but overall good control, without symptoms, no ringing in the ears, no headaches and no nose bleeds. Better on medications. Hyperlipidemia: Mild in intensity but controlled on   rosuvastatin (CRESTOR) 10 MG tablet, TAKE 1 TABLET ONCE DAILY, Disp: 90 tablet, Rfl: 1  , without symptoms, no complications with dietary treatment regimen reporting no side effects or intolereances. Compliant with treatment and diet. No muscle aches, new joint pains or abd pain. Review of Systems   Constitutional: Negative for activity change, chills, diaphoresis, fatigue, fever and unexpected weight change. HENT: Negative for trouble swallowing and voice change. Eyes: Negative for visual disturbance. Respiratory: Negative for cough, chest tightness, shortness of breath and wheezing. Cardiovascular: Negative for chest pain, palpitations and leg swelling. Gastrointestinal: Negative for abdominal pain, blood in stool, constipation, diarrhea, nausea and vomiting. Endocrine: Negative for polydipsia, polyphagia and polyuria. Genitourinary: Negative for dysuria, enuresis, frequency and hematuria. Musculoskeletal: Negative for arthralgias, back pain, gait problem, joint swelling, myalgias and neck stiffness. Skin: Negative for rash.    Neurological: Negative for dizziness, seizures, syncope, facial asymmetry, weakness, light-headedness, numbness and headaches. Hematological: Does not bruise/bleed easily. Psychiatric/Behavioral: Negative for behavioral problems, confusion, hallucinations and suicidal ideas. The patient is not nervous/anxious.           Current Outpatient Medications:     naproxen (NAPROSYN) 500 MG tablet, Take 500 mg by mouth 2 times daily as needed for Pain Take 1 tablet by mouth twice a day as needed for knees, Disp: , Rfl:     turmeric 500 MG CAPS, Take 500 mg by mouth as needed, Disp: , Rfl:     omeprazole (PRILOSEC) 40 MG delayed release capsule, Take 1 capsule by mouth daily, Disp: 90 capsule, Rfl: 1    raloxifene (EVISTA) 60 MG tablet, Take 1 tablet by mouth daily, Disp: 90 tablet, Rfl: 1    cyanocobalamin 1000 MCG/ML injection, INJECT 1 ML PER MONTH AS DIRECTED, Disp: 3 mL, Rfl: 2    losartan (COZAAR) 100 MG tablet, TAKE 1 TABLET ONCE DAILY, Disp: 90 tablet, Rfl: 1    amLODIPine (NORVASC) 5 MG tablet, TAKE 1 TABLET DAILY, Disp: 90 tablet, Rfl: 1    rosuvastatin (CRESTOR) 10 MG tablet, TAKE 1 TABLET ONCE DAILY, Disp: 90 tablet, Rfl: 1    hydroCHLOROthiazide (HYDRODIURIL) 25 MG tablet, Take 1 tablet by mouth daily, Disp: 90 tablet, Rfl: 1    vitamin E 400 UNIT capsule, Take by mouth, Disp: , Rfl:     Calcium Carbonate-Vitamin D (OYSTER SHELL CALCIUM/D) 500-200 MG-UNIT TABS, Take by mouth, Disp: , Rfl:     Cholecalciferol (VITAMIN D) 50 MCG (2000 UT) CAPS capsule, Take by mouth, Disp: , Rfl:     aspirin 81 MG EC tablet, Take 81 mg by mouth daily, Disp: , Rfl:   No Known Allergies    Past Medical History:   Diagnosis Date    Diverticulosis     Hiatal hernia     Hyperlipidemia     Hypertension     Osteopenia     Varicosities of leg     Vitamin D deficiency      Past Surgical History:   Procedure Laterality Date    COLONOSCOPY      2012 AND 2015    TUMOR REMOVAL       Family History   Problem Relation Age of Onset    High Blood Pressure Mother     Heart Failure Mother    Olga Lidia Me Other Father PNEUMONIA    Elevated Lipids Sister     High Blood Pressure Sister     Cancer Brother     Cancer Son     Alcohol Abuse Son      Social History     Socioeconomic History    Marital status:      Spouse name: Not on file    Number of children: Not on file    Years of education: Not on file    Highest education level: Not on file   Occupational History    Not on file   Tobacco Use    Smoking status: Never Smoker    Smokeless tobacco: Never Used   Substance and Sexual Activity    Alcohol use: Never    Drug use: Never    Sexual activity: Not on file   Other Topics Concern    Not on file   Social History Narrative    Not on file     Social Determinants of Health     Financial Resource Strain: Low Risk     Difficulty of Paying Living Expenses: Not hard at all   Food Insecurity: No Food Insecurity    Worried About Running Out of Food in the Last Year: Never true    [address] in the Last Year: Never true   Transportation Needs:     Lack of Transportation (Medical): Not on file    Lack of Transportation (Non-Medical):  Not on file   Physical Activity:     Days of Exercise per Week: Not on file    Minutes of Exercise per Session: Not on file   Stress:     Feeling of Stress : Not on file   Social Connections:     Frequency of Communication with Friends and Family: Not on file    Frequency of Social Gatherings with Friends and Family: Not on file    Attends Gnosticist Services: Not on file    Active Member of Clubs or Organizations: Not on file    Attends Club or Organization Meetings: Not on file    Marital Status: Not on file   Intimate Partner Violence:     Fear of Current or Ex-Partner: Not on file    Emotionally Abused: Not on file    Physically Abused: Not on file    Sexually Abused: Not on file   Housing Stability:     Unable to Pay for Housing in the Last Year: Not on file    Number of [place] in the Last Year: Not on file    Unstable Housing in the Last Year: Not on file       Vitals:    05/23/22 1353   BP: 116/72   Site: Right Upper Arm   Position: Sitting   Pulse: 89   Temp: 97.5 °F (36.4 °C)   SpO2: 97%   Weight: 132 lb 12.8 oz (60.2 kg)   Height: 5' 5\" (1.651 m)       Physical Exam  Vitals and nursing note reviewed. Constitutional:       Appearance: Normal appearance. HENT:      Head: Normocephalic. Right Ear: Tympanic membrane and ear canal normal. There is no impacted cerumen. Left Ear: Tympanic membrane and ear canal normal. There is no impacted cerumen. Nose: Nose normal.      Mouth/Throat:      Mouth: Mucous membranes are dry. Eyes:      Extraocular Movements: Extraocular movements intact. Pupils: Pupils are equal, round, and reactive to light. Neck:      Vascular: No carotid bruit. Cardiovascular:      Rate and Rhythm: Normal rate and regular rhythm. Pulses: Normal pulses. Heart sounds: Normal heart sounds. No murmur heard. No friction rub. No gallop. Pulmonary:      Effort: Pulmonary effort is normal. No respiratory distress. Breath sounds: Normal breath sounds. No stridor. No wheezing, rhonchi or rales. Chest:      Chest wall: No tenderness. Abdominal:      General: Bowel sounds are normal. There is no distension. Palpations: Abdomen is soft. Musculoskeletal:         General: No swelling, tenderness, deformity or signs of injury. Cervical back: No rigidity. No muscular tenderness. Right lower leg: No edema. Left lower leg: No edema. Lymphadenopathy:      Cervical: No cervical adenopathy. Skin:     General: Skin is warm and dry. Capillary Refill: Capillary refill takes 2 to 3 seconds. Findings: No bruising, lesion or rash. Neurological:      General: No focal deficit present. Mental Status: She is alert and oriented to person, place, and time. Motor: No weakness.       Gait: Gait normal.   Psychiatric:         Attention and Perception: Attention normal. Mood and Affect: Mood normal.         Behavior: Behavior normal.         Thought Content: Thought content does not include homicidal or suicidal ideation. Thought content does not include homicidal or suicidal plan.               Seen By:  SCOOTER Choudhury - CNP

## 2022-05-23 NOTE — TELEPHONE ENCOUNTER
----- Message from [facility] \[facility] [place] [facility]\"" sent at 5/23/2022  2:58 PM EDT -----  Subject: Message to Provider    QUESTIONS  Information for Provider? pt would like to know if her pcp thinks it's   necessary for her to have a fall alert necklace. (her children got her   one). ---------------------------------------------------------------------------  --------------  Yaya Slot INFO  What is the best way for the office to contact you? OK to leave message on   voicemail  Preferred Call Back Phone Number? [phone]  ---------------------------------------------------------------------------  --------------  SCRIPT ANSWERS  Relationship to Patient?  Self

## 2022-05-24 ENCOUNTER — TELEPHONE (OUTPATIENT)
Dept: PRIMARY CARE CLINIC | Age: 82
End: 2022-05-24

## 2022-05-24 RX ORDER — TIZANIDINE 2 MG/1
TABLET ORAL
Qty: 30 TABLET | Refills: 0 | Status: SHIPPED
Start: 2022-05-24 | End: 2022-08-08

## 2022-05-24 NOTE — TELEPHONE ENCOUNTER
Patients last appointment 5/23/2022.   Patients next scheduled appointment   Future Appointments   Date Time Provider Rajwinder Castro   7/8/2022  8:30 AM Towanda Paget, [facility]   7/15/2022 10:45 AM SCOOTER Buck CNP [facility]

## 2022-05-24 NOTE — TELEPHONE ENCOUNTER
Just informing you that I talked to her and she said she was not going to do the endoscopy. She will let know people from scheduling and you in her next appt.

## 2022-06-03 ENCOUNTER — TELEPHONE (OUTPATIENT)
Dept: PRIMARY CARE CLINIC | Age: 82
End: 2022-06-03

## 2022-06-03 NOTE — TELEPHONE ENCOUNTER
CSE    Patient location during procedure: OB  Start time: 9/30/2020 12:05 AM  Timeout: 9/30/2020 12:05 AM  End time: 9/30/2020 12:15 AM    Staffing  Authorizing Provider: Mickey Patterson MD  Performing Provider: Red Santos MD    Preanesthetic Checklist  Completed: patient identified, site marked, surgical consent, pre-op evaluation, timeout performed, IV checked, risks and benefits discussed and monitors and equipment checked  CSE  Patient position: sitting  Prep: ChloraPrep and site prepped and draped  Patient monitoring: heart rate, continuous pulse ox and cardiac monitor  Approach: midline  Spinal Needle  Needle type: pencil-tip   Needle gauge: 25 G  Needle length: 5 in  Epidural Needle  Injection technique: EDWIN air  Needle type: Tuohy   Needle gauge: 17 G  Needle length: 3.5 in  Needle insertion depth: 5 cm  Location: L3-4  Needle localization: anatomical landmarks  Catheter  Catheter type: springwound  Catheter size: 19 G  Catheter at skin depth: 9 cm  Test dose: lidocaine 1.5% with Epi 1-to-200,000  Additional Documentation: incremental injection, negative aspiration for CSF, negative aspiration for heme and no paresthesia on injection  Assessment  Sensory level: T7   Dermatomal levels determined by pinch or prick             Yes have someone drive her and she should ask the person doing the injections if what they recommend and Ill write it

## 2022-06-03 NOTE — TELEPHONE ENCOUNTER
Let her know I will be happy to do one but not two I would separate the injections by about 2 months and yes I will address at that visit then

## 2022-06-03 NOTE — TELEPHONE ENCOUNTER
Pt getting IA injections on 6/10 in bilateral knees, should she have someone drive her? Wants to know if she can have a prescription for Naproxen 500 mg- had an old rx from Dr. Thania Glynn.         [facility] Hospitaljose j

## 2022-06-06 ENCOUNTER — OFFICE VISIT (OUTPATIENT)
Dept: PRIMARY CARE CLINIC | Age: 82
End: 2022-06-06

## 2022-06-06 VITALS
RESPIRATION RATE: 18 BRPM | TEMPERATURE: 97.9 F | HEIGHT: 65 IN | SYSTOLIC BLOOD PRESSURE: 122 MMHG | DIASTOLIC BLOOD PRESSURE: 80 MMHG | HEART RATE: 90 BPM | BODY MASS INDEX: 22.06 KG/M2 | OXYGEN SATURATION: 99 % | WEIGHT: 132.4 LBS

## 2022-06-06 DIAGNOSIS — M25.561 CHRONIC PAIN OF BOTH KNEES: ICD-10-CM

## 2022-06-06 DIAGNOSIS — L24.9 IRRITANT CONTACT DERMATITIS, UNSPECIFIED TRIGGER: ICD-10-CM

## 2022-06-06 DIAGNOSIS — K57.30 DIVERTICULAR DISEASE OF COLON: ICD-10-CM

## 2022-06-06 DIAGNOSIS — E53.9 VITAMIN B DEFICIENCY: ICD-10-CM

## 2022-06-06 DIAGNOSIS — G89.29 CHRONIC PAIN OF BOTH KNEES: ICD-10-CM

## 2022-06-06 DIAGNOSIS — E78.00 HYPERCHOLESTEROLEMIA: ICD-10-CM

## 2022-06-06 DIAGNOSIS — M54.6 CHRONIC RIGHT-SIDED THORACIC BACK PAIN: ICD-10-CM

## 2022-06-06 DIAGNOSIS — I10 PRIMARY HYPERTENSION: ICD-10-CM

## 2022-06-06 DIAGNOSIS — M17.12 PRIMARY OSTEOARTHRITIS OF LEFT KNEE: ICD-10-CM

## 2022-06-06 DIAGNOSIS — M17.11 PRIMARY OSTEOARTHRITIS OF RIGHT KNEE: ICD-10-CM

## 2022-06-06 DIAGNOSIS — M25.562 CHRONIC PAIN OF BOTH KNEES: ICD-10-CM

## 2022-06-06 DIAGNOSIS — M17.0 PRIMARY OSTEOARTHRITIS OF BOTH KNEES: Primary | ICD-10-CM

## 2022-06-06 DIAGNOSIS — G89.29 CHRONIC RIGHT-SIDED THORACIC BACK PAIN: ICD-10-CM

## 2022-06-06 PROBLEM — K57.90 DIVERTICULOSIS: Status: ACTIVE | Noted: 2022-06-06

## 2022-06-06 PROBLEM — K44.9 HIATAL HERNIA: Status: ACTIVE | Noted: 2022-06-06

## 2022-06-06 PROBLEM — E55.9 VITAMIN D DEFICIENCY: Status: ACTIVE | Noted: 2022-06-06

## 2022-06-06 PROBLEM — M85.80 OSTEOPENIA: Status: ACTIVE | Noted: 2022-06-06

## 2022-06-06 PROBLEM — I83.90 VARICOSITIES OF LEG: Status: ACTIVE | Noted: 2022-06-06

## 2022-06-06 PROCEDURE — 1123F ACP DISCUSS/DSCN MKR DOCD: CPT | Performed by: NURSE PRACTITIONER

## 2022-06-06 PROCEDURE — 20610 DRAIN/INJ JOINT/BURSA W/O US: CPT | Performed by: NURSE PRACTITIONER

## 2022-06-06 PROCEDURE — 99213 OFFICE O/P EST LOW 20 MIN: CPT | Performed by: NURSE PRACTITIONER

## 2022-06-06 RX ORDER — LIDOCAINE HYDROCHLORIDE 10 MG/ML
2 INJECTION, SOLUTION INFILTRATION; PERINEURAL ONCE
Status: COMPLETED | OUTPATIENT
Start: 2022-06-06 | End: 2022-06-06

## 2022-06-06 RX ORDER — PREDNISONE 10 MG/1
10 TABLET ORAL 2 TIMES DAILY
Qty: 6 TABLET | Refills: 0 | Status: SHIPPED | OUTPATIENT
Start: 2022-06-06 | End: 2022-06-09

## 2022-06-06 RX ORDER — TRIAMCINOLONE ACETONIDE 1 MG/G
CREAM TOPICAL
Qty: 45 G | Refills: 0 | Status: SHIPPED | OUTPATIENT
Start: 2022-06-06

## 2022-06-06 RX ORDER — TRIAMCINOLONE ACETONIDE 40 MG/ML
40 INJECTION, SUSPENSION INTRA-ARTICULAR; INTRAMUSCULAR ONCE
Status: COMPLETED | OUTPATIENT
Start: 2022-06-06 | End: 2022-06-06

## 2022-06-06 RX ADMIN — LIDOCAINE HYDROCHLORIDE 2 ML: 10 INJECTION, SOLUTION INFILTRATION; PERINEURAL at 15:35

## 2022-06-06 RX ADMIN — TRIAMCINOLONE ACETONIDE 40 MG: 40 INJECTION, SUSPENSION INTRA-ARTICULAR; INTRAMUSCULAR at 15:37

## 2022-06-06 ASSESSMENT — ENCOUNTER SYMPTOMS
BLOOD IN STOOL: 0
NAUSEA: 0
TROUBLE SWALLOWING: 0
ABDOMINAL PAIN: 0
COUGH: 0
VOICE CHANGE: 0
SHORTNESS OF BREATH: 0
VOMITING: 0
CHEST TIGHTNESS: 0
DIARRHEA: 0
BACK PAIN: 1
CONSTIPATION: 0
WHEEZING: 0

## 2022-06-06 NOTE — PATIENT INSTRUCTIONS
Patient Education        Chronic Pain: Care Instructions  Your Care Instructions     Chronic pain is pain that lasts a long time (months or even years) and may or may not have a clear cause. It is different from acute pain, which usually does have a clear cause--like an injury or illness--and gets better over time. Chronicpain:   Lasts over time but may vary from day to day.  Does not go away despite efforts to end it.  May disrupt your sleep and lead to fatigue.  May cause depression or anxiety.  May make your muscles tense, causing more pain.  Can disrupt your work, hobbies, home life, and relationships with friends and family. Chronic pain is a very real condition. It is not just in your head. Treatment can help and usually includes several methods used together, such as medicines, physical therapy, exercise, and other treatments. Learning how to relax andchanging negative thought patterns can also help you cope. Chronic pain is complex. Taking an active role in your treatment will help you better manage your pain. Tell your doctor if you have trouble dealing with your pain. You may have to try several things before you find what works best foryou. Follow-up care is a key part of your treatment and safety. Be sure to make and go to all appointments, and call your doctor if you are having problems. It's also a good idea to know your test results and keep alist of the medicines you take. How can you care for yourself at home?  Pace yourself. Break up large jobs into smaller tasks. Save harder tasks for days when you have less pain, or go back and forth between hard tasks and easier ones. Take rest breaks.  Relax, and reduce stress. Relaxation techniques such as deep breathing or meditation can help.  Keep moving. Gentle, daily exercise can help reduce pain over the long run. Try low- or no-impact exercises such as walking, swimming, and stationary biking. Do stretches to stay flexible.    Try heat, cold packs, and massage.  Get enough sleep. Chronic pain can make you tired and drain your energy. Talk with your doctor if you have trouble sleeping because of pain.  Think positive. Your thoughts can affect your pain level. Do things that you enjoy to distract yourself when you have pain instead of focusing on the pain. See a movie, read a book, listen to music, or spend time with a friend.  If you think you are depressed, talk to your doctor about treatment.  Keep a daily pain diary. Record how your moods, thoughts, sleep patterns, activities, and medicine affect your pain. You may find that your pain is worse during or after certain activities or when you are feeling a certain emotion. Having a record of your pain can help you and your doctor find the best ways to treat your pain.  Take pain medicines exactly as directed. ? If the doctor gave you a prescription medicine for pain, take it as prescribed. ? If you are not taking a prescription pain medicine, ask your doctor if you can take an over-the-counter medicine. Reducing constipation caused by pain medicine   Talk to your doctor about a laxative. If a laxative doesn't work, your doctor may suggest a prescription medicine.  Include fruits, vegetables, beans, and whole grains in your diet each day. These foods are high in fiber.  If your doctor recommends it, get more exercise. Walking is a good choice. Bit by bit, increase the amount you walk every day. Try for at least 30 minutes on most days of the week.  Schedule time each day for a bowel movement. A daily routine may help. Take your time and do not strain when having a bowel movement. When should you call for help? Call your doctor now or seek immediate medical care if:     Your pain gets worse or is out of control.      You feel down or blue, or you do not enjoy things like you once did. You may be depressed, which is common in people with chronic pain.  Depression can be treated.      You have vomiting or cramps for more than 2 hours. Watch closely for changes in your health, and be sure to contact your doctor if:     You cannot sleep because of pain.      You are very worried or anxious about your pain.      You have trouble taking your pain medicine.      You have any concerns about your pain medicine.      You have trouble with bowel movements, such as:  ? No bowel movement in 3 days. ? Blood in the anal area, in your stool, or on the toilet paper. ? Diarrhea for more than 24 hours. Where can you learn more? Go to https://LincarepepicSimplicissimus Book Farmeb.engageSimply. org and sign in to your [email or web address] account. Enter N004 in the [facility] box to learn more about \"Chronic Pain: Care Instructions. \"     If you do not have an account, please click on the \"Sign Up Now\" link. Current as of: December 13, 2021               Content Version: 13.2  © 2006-2022 [employer]. Care instructions adapted under license by [facility] [place] ([facility]). If you have questions about a medical condition or this instruction, always ask your healthcare professional. [place] [address] any warranty or liability for your use of this information. Patient Education        Learning About Diverticulosis and Diverticulitis  What are diverticulosis and diverticulitis? In diverticulosis and diverticulitis, pouches called diverticula form in thewall of the large intestine, or colon.  In diverticulosis, the pouches do not cause any pain or other symptoms.  In diverticulitis, the pouches get inflamed or infected and cause symptoms. Doctors aren't sure what causes these pouches in the colon. But they think that a low-fiber diet may play a role. Without fiber to add bulk to the stool, the colon has to work harder than normal to push the stool forward. The pressurefrom this may cause pouches to form in weak spots along the colon.   Some people with diverticulosis get diverticulitis. But experts don't know whythis happens. What are the symptoms?  In diverticulosis, most people don't have symptoms. But pouches sometimes bleed.  In diverticulitis, symptoms may last from a few hours to a week or more. They include:  ? Belly pain. This is usually in the lower left side. It is sometimes worse when you move. This is the most common symptom. ? Fever and chills. ? Bloating and gas. ? Diarrhea or constipation. ? Nausea and sometimes vomiting.  ? Not feeling like eating. How can you prevent diverticulitis? You may be able to lower your chance of getting diverticulitis. You can do thisby taking steps to prevent constipation.  Eat fruits, vegetables, beans, and whole grains every day. These foods are high in fiber.  Drink plenty of fluids. If you have kidney, heart, or liver disease and have to limit fluids, talk with your doctor before you increase the amount of fluids you drink.  Get at least 30 minutes of exercise on most days of the week. Walking is a good choice. You also may want to do other activities, such as running, swimming, cycling, or playing tennis or team sports.  Take a fiber supplement, such as Citrucel or Metamucil, every day if needed. Read and follow all instructions on the label.  Schedule time each day for a bowel movement. Having a daily routine may help. Take your time and do not strain when having a bowel movement. Some people avoid nuts, seeds, berries, and popcorn. They believe that these foods might get trapped in the diverticula and cause pain. But there is noproof that these foods cause diverticulitis or make it worse. How are these problems treated?  The best way to treat diverticulosis is to avoid constipation.  Treatment for diverticulitis includes antibiotics. It often includes a change in your diet. You may need only liquids at first. Your doctor may suggest pain medicines for pain or belly cramps.  In some cases, surgery may be needed. Follow-up care is a key part of your treatment and safety. Be sure to make and go to all appointments, and call your doctor if you are having problems. It's also a good idea to know your test results and keep alist of the medicines you take. Where can you learn more? Go to https://chpepiceweb.Third Chicken. org and sign in to your [employer] account. Enter I938 in the [facility] box to learn more about \"Learning About Diverticulosis and Diverticulitis. \"     If you do not have an account, please click on the \"Sign Up Now\" link. Current as of: September 8, 2021               Content Version: 13.2  © 2006-2022 [employer]. Care instructions adapted under license by [facility] ([facility]). If you have questions about a medical condition or this instruction, always ask your healthcare professional. [place] [address] any warranty or liability for your use of this information. Patient Education        Knee Arthritis: Care Instructions  Your Care Instructions     Knee arthritis is a breakdown of the cartilage that cushions your knee joint. When the cartilage wears down, your bones rub against each other. This causespain and stiffness. Knee arthritis tends to get worse with time. Treatment for knee arthritis involves reducing pain, making the leg muscles stronger, and staying at a healthy body weight. The treatment usually does not improve the health of the cartilage, but it can reduce pain and improve howwell your knee works. You can take simple measures to protect your knee joints, ease your pain, andhelp you stay active. Follow-up care is a key part of your treatment and safety. Be sure to make and go to all appointments, and call your doctor if you are having problems. It's also a good idea to know your test results and keep alist of the medicines you take. How can you care for yourself at home?    Know that knee arthritis will cause more pain on some days than on others.  Stay at a healthy weight. Lose weight if you are overweight. When you stand up, the pressure on your knees from every pound of body weight is multiplied four times. So if you lose 10 pounds, you will reduce the pressure on your knees by 40 pounds.  Talk to your doctor or physical therapist about exercises that will help ease joint pain. ? Stretch to help prevent stiffness and to prevent injury before you exercise. You may enjoy gentle forms of yoga to help keep your knee joints and muscles flexible. ? Walk instead of jog.  ? Ride a bike. This makes your thigh muscles stronger and takes pressure off your knee. ? Wear well-fitting and comfortable shoes. ? Exercise in chest-deep water. This can help you exercise longer with less pain. ? Avoid exercises that include squatting or kneeling. They can put a lot of strain on your knees. ? Talk to your doctor to make sure that the exercise you do is not making the arthritis worse.  Do not sit for long periods of time. Try to walk once in a while to keep your knee from getting stiff.  Ask your doctor or physical therapist whether shoe inserts may reduce your arthritis pain.  If you can afford it, get new athletic shoes at least every year. This can help reduce the strain on your knees.  Use a device to help you do everyday activities. ? A cane or walking stick can help you keep your balance when you walk. Hold the cane or walking stick in the hand opposite the painful knee. ? If you feel like you may fall when you walk, try using crutches or a front-wheeled walker. These can prevent falls that could cause more damage to your knee. ? A knee brace may help keep your knee stable and prevent pain. ? You also can use other things to make life easier, such as a higher toilet seat and handrails in the bathtub or shower.  Take pain medicines exactly as directed. ? Do not wait until you are in severe pain.  You will get better results if you take it sooner. ? If you are not taking a prescription pain medicine, take an over-the-counter medicine such as acetaminophen (Tylenol), ibuprofen (Advil, Motrin), or naproxen (Aleve). Read and follow all instructions on the label. ? Do not take two or more pain medicines at the same time unless the doctor told you to. Many pain medicines have acetaminophen, which is Tylenol. Too much acetaminophen (Tylenol) can be harmful. ? Tell your doctor if you take a blood thinner, have diabetes, or have allergies to shellfish.  Ask your doctor if you might benefit from a shot of steroid medicine into your knee. This may provide pain relief for several months.  Many people take the supplements glucosamine and chondroitin for osteoarthritis. Some people feel they help, but the medical research does not show that they work. Talk to your doctor before you take these supplements. When should you call for help? Call your doctor now or seek immediate medical care if:     You have sudden swelling, warmth, or pain in your knee.      You have knee pain and a fever or rash.      You have such bad pain that you cannot use your knee. Watch closely for changes in your health, and be sure to contact your doctor ifyou have any problems. Where can you learn more? Go to https://Simworx.Budge. org and sign in to your [employer] account. Enter D663 in the [facility] box to learn more about \"Knee Arthritis: Care Instructions. \"     If you do not have an account, please click on the \"Sign Up Now\" link. Current as of: December 20, 2021               Content Version: 13.2  © 2006-2022 [employer]. Care instructions adapted under license by [facility] [place] ([facility]). If you have questions about a medical condition or this instruction, always ask your healthcare professional. [address] any warranty or liability for your use of this information.

## 2022-06-06 NOTE — PROGRESS NOTES
Terrence Simmons : 1940 Sex: female  Age: 79 y.o. Chief Complaint   Patient presents with    Knee Pain     she wants shots in bilateral knees, pain 8/10     Back Pain     pinch nerve in upper back that is bothering her       Assessment and Plan:  Primary osteoarthritis of both knees  Chronic pain of both knees  Primary hypertension  Hypercholesterolemia  Irritant contact dermatitis, unspecified trigger  Vitamin B deficiency  Primary osteoarthritis of left knee  Primary osteoarthritis of right knee  Diverticular disease of colon  Chronic right-sided thoracic back pain       Return in about 2 months (around 2022), or if symptoms worsen or fail to improve. Educational materials  printed for patient's review and were included in patient instructions on her After Visit Summary and given to patient at the end of visit. Counseled regarding above diagnosis, including possible risks and complications,  especially if left uncontrolled. Counseled regarding the possible side effects, risks, benefits and alternatives to treatment; patient and/or guardian verbalizes understanding, agrees, feels comfortable with and wishes to proceed with above treatment plan. Advised patient to call with any new medication issues, and read all Rx info from pharmacy to assure aware of all possible risks and side effects of medication before taking. Reviewed age and gender appropriate health screening exams and vaccinations. Advised patient regarding importance of keeping up with recommended health maintenance and to schedule as soon as possible if overdue, as this is important in assessing for undiagnosed pathology, especially cancer, as well as protecting against potentially harmful/life threatening disease. Patient verbalizes understanding and agrees with above counseling, assessment and plan. All questions answered.     On 22 I have spent 30 reviewing previous notes, test results and face to face with the patient discussing the diagnosis and importance of compliance with the treatment plan as well as documenting on the day of the visit. Educational materials exercises printed for patient's review and were included in patient instructions on their After Visit Summary and given to patient at the end of visit.      USPTF:    (  ) Colorectal Cancer: Screening --Adults aged 48 to 76 years  Grade: A (Recommended) recommends screening for colorectal cancer starting at age 48 years and continuing until age 76 years. (B/P 116/72) High Blood Pressure: Screening and Home Monitoring -- Adults  Grade: A (Recommended) recommends screening for high blood pressure in ages 25 years or older. obtain measurements outside of the clinical setting for diagnostic confirmation before starting treatment. Annual screening for adults aged 36 years or older or those who are at increased risk for blood pressure    (, TRI 93, LDL 98)  Lipid Disorders in Adults: Screening -- Men 28 and Older  Grade: A (Recommended) recommends screening men aged 28 and older for lipid disorders. (Non Drinker) Alcohol Misuse: Screening and Behavioral Counseling Interventions in Primary Care -- Adults  Grade: B (Recommended) recommends that clinicians screen adults aged 25 years or older for alcohol misuse and provide persons engaged in risky or hazardous drinking with brief behavioral counseling interventions to reduce alcohol misuse. (BGL 79) Abnormal Blood Glucose and Type 2 Diabetes Mellitus: Screening -- Adults aged 36 to 79 years who are overweight or obese Grade: B (Recommended)    (BMI 22.10)  Obesity: Screening for and Management of-- All Adults  Grade: B(Recommended) recommends screening all adults for obesity. Clinicians should offer or refer patients with a body mass index (BMI) of 30 kg/m2 or higher to intensive, multicomponent behavioral interventions.          (Not a fall risk)  Fall Prevention -- Exercise/Physical Therapy: Community-dwelling Adults 72 Years or Older, Increased Risk for Falls   Grade: B (Recommended) recommends exercise or physical therapy to prevent falls in community-dwelling adults aged 72 years or older who are at increased risk for falls. (No new symptoms noted or reported today)  Depression: Screening -- General adult population, including pregnant and postpartum women  Grade: B(Recommended) recommends screening for depression in the general adult population,  Screening should be implemented with adequate systems in place to ensure accurate diagnosis, effective treatment, and appropriate follow-up. (  ) Glaucoma: Screening - Adults and Diabetic Eye Exam     (  ) Vitamin D Deficiency: Screening --       (TSH 2.64) Thyroid Dysfunction: Screening --      (  ) Cervical Cancer: Screening -- Women 21 to 72 (Pap Smear) or 30-65 (in combo with HPV testing)  Grade: A(Recommended)    (  ) Breast Cancer: Screening with Mammography-- Women aged 48 to 76 years  Grade: B (Recommended) recommends biennial screening mammography for women aged 48 to 76 years. (  ) Osteoporosis: Screening -- Women 65+ and Younger Women at Increased Risk  Grade: B(Recommended) recommends screening for osteoporosis in women aged 72 years or older and in younger women whose fracture risk is equal to or greater than that of a 79-year-old white woman who has no additional risk factors. Jen Wilburn is a very pleasant 29-year-old white female with bilateral knee pain from arthritis. She is here for a joint injection because her orthopedic surgeon is deployed at this time. She denies any fevers or trauma. It has been a long time since she had a joint injection. Knee Pain   Pertinent negatives include no numbness. Back Pain  Pertinent negatives include no abdominal pain, chest pain, dysuria, fever, headaches, numbness or weakness.        CHRONIC CONDITION:     HTN: Stable hypertension, controlled on    losartan (COZAAR) 100 MG tablet, TAKE 1 TABLET ONCE DAILY, Disp: 90 tablet, Rfl: 1   amLODIPine (NORVASC) 5 MG tablet, TAKE 1 TABLET DAILY, Disp: 90 tablet, Rfl: 1   hydroCHLOROthiazide (HYDRODIURIL) 25 MG tablet, Take 1 tablet by mouth daily, Disp: 90 tablet, Rfl: 1    vitamin E 400 UNIT capsule, Take by mouth, Disp: , Rfl:    aspirin 81 MG EC tablet, Take 81 mg by mouth daily, Disp: , Rfl: , remains at a mild intensity but overall good control, without symptoms, no ringing in the ears, no headaches and no nose bleeds. Better on medications. Hyperlipidemia: Mild in intensity but controlled on   rosuvastatin (CRESTOR) 10 MG tablet, TAKE 1 TABLET ONCE DAILY, Disp: 90 tablet, Rfl: 1  , without symptoms, no complications with dietary treatment regimen reporting no side effects or intolereances. Compliant with treatment and diet. No muscle aches, new joint pains or abd pain. Review of Systems   Constitutional: Negative for activity change, chills, diaphoresis, fatigue, fever and unexpected weight change. HENT: Negative for trouble swallowing and voice change. Eyes: Negative for visual disturbance. Respiratory: Negative for cough, chest tightness, shortness of breath and wheezing. Cardiovascular: Negative for chest pain, palpitations and leg swelling. Gastrointestinal: Negative for abdominal pain, blood in stool, constipation, diarrhea, nausea and vomiting. Endocrine: Negative for polydipsia, polyphagia and polyuria. Genitourinary: Negative for dysuria, enuresis, frequency and hematuria. Musculoskeletal: Positive for arthralgias, back pain and joint swelling. Negative for gait problem, myalgias and neck stiffness. Skin: Negative for rash. Neurological: Negative for dizziness, seizures, syncope, facial asymmetry, weakness, light-headedness, numbness and headaches. Hematological: Does not bruise/bleed easily.    Psychiatric/Behavioral: Negative for behavioral problems, confusion, hallucinations and suicidal ideas. The patient is not nervous/anxious. Current Outpatient Medications:     triamcinolone (KENALOG) 0.1 % cream, Apply topically 2 times daily. , Disp: 45 g, Rfl: 0    predniSONE (DELTASONE) 10 MG tablet, Take 1 tablet by mouth 2 times daily for 3 days, Disp: 6 tablet, Rfl: 0    tiZANidine (ZANAFLEX) 2 MG tablet, TAKE ONE TABLET BY MOUTH TWICE A DAY AS NEEDED (MUSCLE SPASMS), Disp: 30 tablet, Rfl: 0    naproxen (NAPROSYN) 500 MG tablet, Take 500 mg by mouth 2 times daily as needed for Pain Take 1 tablet by mouth twice a day as needed for knees, Disp: , Rfl:     turmeric 500 MG CAPS, Take 500 mg by mouth as needed, Disp: , Rfl:     omeprazole (PRILOSEC) 40 MG delayed release capsule, Take 1 capsule by mouth daily, Disp: 90 capsule, Rfl: 1    raloxifene (EVISTA) 60 MG tablet, Take 1 tablet by mouth daily, Disp: 90 tablet, Rfl: 1    cyanocobalamin 1000 MCG/ML injection, INJECT 1 ML PER MONTH AS DIRECTED, Disp: 3 mL, Rfl: 2    losartan (COZAAR) 100 MG tablet, TAKE 1 TABLET ONCE DAILY, Disp: 90 tablet, Rfl: 1    amLODIPine (NORVASC) 5 MG tablet, TAKE 1 TABLET DAILY, Disp: 90 tablet, Rfl: 1    rosuvastatin (CRESTOR) 10 MG tablet, TAKE 1 TABLET ONCE DAILY, Disp: 90 tablet, Rfl: 1    hydroCHLOROthiazide (HYDRODIURIL) 25 MG tablet, Take 1 tablet by mouth daily, Disp: 90 tablet, Rfl: 1    vitamin E 400 UNIT capsule, Take by mouth, Disp: , Rfl:     Calcium Carbonate-Vitamin D (OYSTER SHELL CALCIUM/D) 500-200 MG-UNIT TABS, Take by mouth, Disp: , Rfl:     Cholecalciferol (VITAMIN D) 50 MCG (2000 UT) CAPS capsule, Take by mouth, Disp: , Rfl:     aspirin 81 MG EC tablet, Take 81 mg by mouth daily, Disp: , Rfl:   No Known Allergies    Past Medical History:   Diagnosis Date    Diverticulosis     Hiatal hernia     Hyperlipidemia     Hypertension     Osteopenia     Varicosities of leg     Vitamin D deficiency      Past Surgical History:   Procedure Laterality Date    COLONOSCOPY      2012 AND 2015  TUMOR REMOVAL       Family History   Problem Relation Age of Onset    High Blood Pressure Mother     Heart Failure Mother     Other Father         PNEUMONIA    Elevated Lipids Sister     High Blood Pressure Sister     Cancer Brother     Cancer Son     Alcohol Abuse Son      Social History     Socioeconomic History    Marital status:      Spouse name: Not on file    Number of children: Not on file    Years of education: Not on file    Highest education level: Not on file   Occupational History    Not on file   Tobacco Use    Smoking status: Never Smoker    Smokeless tobacco: Never Used   Substance and Sexual Activity    Alcohol use: Never    Drug use: Never    Sexual activity: Not on file   Other Topics Concern    Not on file   Social History Narrative    Not on file     Social Determinants of Health     Financial Resource Strain: Low Risk     Difficulty of Paying Living Expenses: Not hard at all   Food Insecurity: No Food Insecurity    Worried About Running Out of Food in the Last Year: Never true    [address] in the Last Year: Never true   Transportation Needs:     Lack of Transportation (Medical): Not on file    Lack of Transportation (Non-Medical):  Not on file   Physical Activity:     Days of Exercise per Week: Not on file    Minutes of Exercise per Session: Not on file   Stress:     Feeling of Stress : Not on file   Social Connections:     Frequency of Communication with Friends and Family: Not on file    Frequency of Social Gatherings with Friends and Family: Not on file    Attends [employer] Services: Not on file    Active Member of Clubs or Organizations: Not on file    Attends Club or Organization Meetings: Not on file    Marital Status: Not on file   Intimate Partner Violence:     Fear of Current or Ex-Partner: Not on file    Emotionally Abused: Not on file    Physically Abused: Not on file    Sexually Abused: Not on file   Housing Stability:     Unable to Pay for Housing in the Last Year: Not on file    Number of Places Lived in the Last Year: Not on file    Unstable Housing in the Last Year: Not on file       Vitals:    06/06/22 1407   BP: 122/80   Site: Left Upper Arm   Position: Sitting   Cuff Size: Medium Adult   Pulse: 90   Resp: 18   Temp: 97.9 °F (36.6 °C)   TempSrc: Temporal   SpO2: 99%   Weight: 132 lb 6.4 oz (60.1 kg)   Height: 5' 5\" (1.651 m)       Physical Exam  Vitals and nursing note reviewed. Constitutional:       Appearance: Normal appearance. HENT:      Head: Normocephalic. Right Ear: Tympanic membrane and ear canal normal. There is no impacted cerumen. Left Ear: Tympanic membrane and ear canal normal. There is no impacted cerumen. Nose: Nose normal.      Mouth/Throat:      Mouth: Mucous membranes are dry. Eyes:      Extraocular Movements: Extraocular movements intact. Pupils: Pupils are equal, round, and reactive to light. Neck:      Vascular: No carotid bruit. Cardiovascular:      Rate and Rhythm: Normal rate and regular rhythm. Pulses: Normal pulses. Heart sounds: Normal heart sounds. No murmur heard. No friction rub. No gallop. Pulmonary:      Effort: Pulmonary effort is normal. No respiratory distress. Breath sounds: Normal breath sounds. No stridor. No wheezing, rhonchi or rales. Chest:      Chest wall: No tenderness. Abdominal:      General: Abdomen is flat. Bowel sounds are normal. There is no distension. Palpations: Abdomen is soft. Musculoskeletal:         General: No swelling, tenderness, deformity or signs of injury. Cervical back: No rigidity. No muscular tenderness. Right lower leg: No edema. Left lower leg: No edema.       Comments:    KNEE:      Left     Inspection/Palpation - no deformities, no erythema, medial tenderness, no effusion, no lateral collateral ligament tenderness, no medial collateral ligament tenderness, no patellar tenderness, no patellofemoral crepitus, no soft tissue mass, no increased warmth, no Bakers cyst, no soft tissue swelling, no valgus deformity, no varus deformity    Range of Motion -                                Limited range of motion due to pain    Strength/Tone - normal strength and tone    Diagnostic Tests - negative patellar compression, negative patellar apprehension, negative patellar hypermobility, negative Lachman test, Anterior drawer negative, Posterior drawer negative, Posterior sag sign negative, negative  Medial Titi, negative Lateral Titi, negative external rotation test, no effusion and joint stable      Lymphadenopathy:      Cervical: No cervical adenopathy. Skin:     General: Skin is warm and dry. Capillary Refill: Capillary refill takes 2 to 3 seconds. Findings: No bruising, lesion or rash. Neurological:      General: No focal deficit present. Mental Status: She is alert and oriented to person, place, and time. Motor: No weakness. Gait: Gait normal.   Psychiatric:         Attention and Perception: Attention normal.         Mood and Affect: Mood normal.         Behavior: Behavior normal.         Thought Content: Thought content does not include homicidal or suicidal ideation. Thought content does not include homicidal or suicidal plan. 12 min spent performing and discussing The procedure risks, hazards and alternatives with the patient. Medications used: 1% lidocaine without epi   2  ml, mixed with 40 mg/ml of Kenalog    The area over the affected Left knee was prepped in the usual sterile manner the needle was inserted anteriorly / posteriorly in the joint and negative aspiration was noted and then steroid was injected. There were no complications during the procedure. Follow-up: The patient tolerated the procedure well without complications standard post procedure care was explained and return precautions given to the patient.         Seen By:  Shelley Palm Wenceslao, APRN - CNP

## 2022-08-08 ENCOUNTER — OFFICE VISIT (OUTPATIENT)
Dept: PRIMARY CARE CLINIC | Age: 82
End: 2022-08-08
Payer: MEDICARE

## 2022-08-08 VITALS
TEMPERATURE: 98.3 F | SYSTOLIC BLOOD PRESSURE: 130 MMHG | BODY MASS INDEX: 22.06 KG/M2 | RESPIRATION RATE: 20 BRPM | HEIGHT: 65 IN | HEART RATE: 87 BPM | OXYGEN SATURATION: 98 % | DIASTOLIC BLOOD PRESSURE: 70 MMHG | WEIGHT: 132.4 LBS

## 2022-08-08 VITALS
HEART RATE: 87 BPM | RESPIRATION RATE: 20 BRPM | TEMPERATURE: 98.3 F | OXYGEN SATURATION: 98 % | DIASTOLIC BLOOD PRESSURE: 70 MMHG | SYSTOLIC BLOOD PRESSURE: 130 MMHG | WEIGHT: 132.4 LBS | HEIGHT: 65 IN | BODY MASS INDEX: 22.06 KG/M2

## 2022-08-08 DIAGNOSIS — M25.562 CHRONIC PAIN OF BOTH KNEES: ICD-10-CM

## 2022-08-08 DIAGNOSIS — G31.9 CEREBRAL ATROPHY (HCC): ICD-10-CM

## 2022-08-08 DIAGNOSIS — K57.90 DIVERTICULOSIS: ICD-10-CM

## 2022-08-08 DIAGNOSIS — K44.9 HIATAL HERNIA: ICD-10-CM

## 2022-08-08 DIAGNOSIS — M25.561 CHRONIC PAIN OF BOTH KNEES: ICD-10-CM

## 2022-08-08 DIAGNOSIS — E53.9 VITAMIN B DEFICIENCY: ICD-10-CM

## 2022-08-08 DIAGNOSIS — E78.00 HYPERCHOLESTEROLEMIA: ICD-10-CM

## 2022-08-08 DIAGNOSIS — I10 PRIMARY HYPERTENSION: ICD-10-CM

## 2022-08-08 DIAGNOSIS — E78.00 HYPERCHOLESTEROLEMIA: Primary | ICD-10-CM

## 2022-08-08 DIAGNOSIS — G89.29 CHRONIC PAIN OF BOTH KNEES: ICD-10-CM

## 2022-08-08 DIAGNOSIS — Z00.00 INITIAL MEDICARE ANNUAL WELLNESS VISIT: Primary | ICD-10-CM

## 2022-08-08 DIAGNOSIS — Z13.29 SCREENING FOR THYROID DISORDER: ICD-10-CM

## 2022-08-08 DIAGNOSIS — I10 ESSENTIAL HYPERTENSION: ICD-10-CM

## 2022-08-08 LAB
BACTERIA: NORMAL /HPF
BASOPHILS ABSOLUTE: 0.03 E9/L (ref 0–0.2)
BASOPHILS RELATIVE PERCENT: 0.5 % (ref 0–2)
BILIRUBIN URINE: NEGATIVE
BLOOD, URINE: ABNORMAL
CLARITY: CLEAR
COLOR: YELLOW
EOSINOPHILS ABSOLUTE: 0.42 E9/L (ref 0.05–0.5)
EOSINOPHILS RELATIVE PERCENT: 6.5 % (ref 0–6)
GLUCOSE URINE: NEGATIVE MG/DL
HCT VFR BLD CALC: 39.6 % (ref 34–48)
HEMOGLOBIN: 12.5 G/DL (ref 11.5–15.5)
IMMATURE GRANULOCYTES #: 0.05 E9/L
IMMATURE GRANULOCYTES %: 0.8 % (ref 0–5)
KETONES, URINE: NEGATIVE MG/DL
LEUKOCYTE ESTERASE, URINE: NEGATIVE
LYMPHOCYTES ABSOLUTE: 2.41 E9/L (ref 1.5–4)
LYMPHOCYTES RELATIVE PERCENT: 37.4 % (ref 20–42)
MCH RBC QN AUTO: 28.5 PG (ref 26–35)
MCHC RBC AUTO-ENTMCNC: 31.6 % (ref 32–34.5)
MCV RBC AUTO: 90.2 FL (ref 80–99.9)
MONOCYTES ABSOLUTE: 0.59 E9/L (ref 0.1–0.95)
MONOCYTES RELATIVE PERCENT: 9.2 % (ref 2–12)
NEUTROPHILS ABSOLUTE: 2.94 E9/L (ref 1.8–7.3)
NEUTROPHILS RELATIVE PERCENT: 45.6 % (ref 43–80)
NITRITE, URINE: NEGATIVE
PDW BLD-RTO: 14.7 FL (ref 11.5–15)
PH UA: 7 (ref 5–9)
PLATELET # BLD: 257 E9/L (ref 130–450)
PMV BLD AUTO: 10.1 FL (ref 7–12)
PROTEIN UA: 100 MG/DL
RBC # BLD: 4.39 E12/L (ref 3.5–5.5)
RBC UA: NORMAL /HPF (ref 0–2)
SPECIFIC GRAVITY UA: 1.01 (ref 1–1.03)
UROBILINOGEN, URINE: 0.2 E.U./DL
WBC # BLD: 6.4 E9/L (ref 4.5–11.5)
WBC UA: NORMAL /HPF (ref 0–5)

## 2022-08-08 PROCEDURE — 1123F ACP DISCUSS/DSCN MKR DOCD: CPT | Performed by: NURSE PRACTITIONER

## 2022-08-08 PROCEDURE — 99214 OFFICE O/P EST MOD 30 MIN: CPT | Performed by: NURSE PRACTITIONER

## 2022-08-08 PROCEDURE — G0438 PPPS, INITIAL VISIT: HCPCS | Performed by: NURSE PRACTITIONER

## 2022-08-08 RX ORDER — DONEPEZIL HYDROCHLORIDE 10 MG/1
10 TABLET, FILM COATED ORAL NIGHTLY
Qty: 30 TABLET | Refills: 5 | Status: SHIPPED
Start: 2022-08-08 | End: 2022-10-12

## 2022-08-08 RX ORDER — DONEPEZIL HYDROCHLORIDE 5 MG/1
5 TABLET, FILM COATED ORAL NIGHTLY
Qty: 30 TABLET | Refills: 0 | Status: SHIPPED
Start: 2022-08-08 | End: 2022-10-12 | Stop reason: SDUPTHER

## 2022-08-08 SDOH — ECONOMIC STABILITY: FOOD INSECURITY: WITHIN THE PAST 12 MONTHS, YOU WORRIED THAT YOUR FOOD WOULD RUN OUT BEFORE YOU GOT MONEY TO BUY MORE.: NEVER TRUE

## 2022-08-08 SDOH — ECONOMIC STABILITY: FOOD INSECURITY: WITHIN THE PAST 12 MONTHS, THE FOOD YOU BOUGHT JUST DIDN'T LAST AND YOU DIDN'T HAVE MONEY TO GET MORE.: NEVER TRUE

## 2022-08-08 ASSESSMENT — ENCOUNTER SYMPTOMS
TROUBLE SWALLOWING: 0
BLOOD IN STOOL: 0
CHEST TIGHTNESS: 0
CONSTIPATION: 0
NAUSEA: 0
WHEEZING: 0
SHORTNESS OF BREATH: 0
VOICE CHANGE: 0
ABDOMINAL PAIN: 0
VOMITING: 0
DIARRHEA: 0
BACK PAIN: 1
COUGH: 0

## 2022-08-08 ASSESSMENT — SOCIAL DETERMINANTS OF HEALTH (SDOH): HOW HARD IS IT FOR YOU TO PAY FOR THE VERY BASICS LIKE FOOD, HOUSING, MEDICAL CARE, AND HEATING?: NOT HARD AT ALL

## 2022-08-08 ASSESSMENT — PATIENT HEALTH QUESTIONNAIRE - PHQ9
2. FEELING DOWN, DEPRESSED OR HOPELESS: 0
SUM OF ALL RESPONSES TO PHQ QUESTIONS 1-9: 0
2. FEELING DOWN, DEPRESSED OR HOPELESS: 0
1. LITTLE INTEREST OR PLEASURE IN DOING THINGS: 0
SUM OF ALL RESPONSES TO PHQ QUESTIONS 1-9: 0
1. LITTLE INTEREST OR PLEASURE IN DOING THINGS: 0
SUM OF ALL RESPONSES TO PHQ QUESTIONS 1-9: 0
SUM OF ALL RESPONSES TO PHQ QUESTIONS 1-9: 0
SUM OF ALL RESPONSES TO PHQ9 QUESTIONS 1 & 2: 0
SUM OF ALL RESPONSES TO PHQ QUESTIONS 1-9: 0
SUM OF ALL RESPONSES TO PHQ9 QUESTIONS 1 & 2: 0

## 2022-08-08 ASSESSMENT — LIFESTYLE VARIABLES
HOW MANY STANDARD DRINKS CONTAINING ALCOHOL DO YOU HAVE ON A TYPICAL DAY: PATIENT DOES NOT DRINK
HOW OFTEN DO YOU HAVE A DRINK CONTAINING ALCOHOL: NEVER

## 2022-08-08 NOTE — PROGRESS NOTES
Virgie Cardenas : 1940 Sex: female  Age: 79 y.o. Chief Complaint   Patient presents with    Hypertension     2 mo check up, no labs    Knee Pain     Bilateral knee pain         Assessment and Plan:    Beatriz Hurst was seen today for hypertension and knee pain. Diagnoses and all orders for this visit:    Hypercholesterolemia    Cerebral atrophy ([address]    Diverticulosis    Primary hypertension    Chronic pain of both knees    Vitamin B deficiency    Hiatal hernia    BMI 22.0-22.9, adult    Other orders  -     donepezil (ARICEPT) 5 MG tablet; Take 1 tablet by mouth nightly  -     donepezil (ARICEPT) 10 MG tablet; Take 1 tablet by mouth nightly      USPTF:    No results found for: LABA1C  No results found for: EAG   Abnormal Blood Glucose and Type 2 Diabetes Mellitus: Screening -- Adults aged 36 to 79 years who are overweight or obese Grade: B (Recommended)    BP Readings from Last 3 Encounters:   22 130/70   22 130/70   22 122/80     High Blood Pressure: Screening and Home Monitoring -- Adults  Grade: A (Recommended) recommends screening for high blood pressure in ages 25 years or older. obtain measurements outside of the clinical setting for diagnostic confirmation before starting treatment. Annual screening for adults aged 36 years or older or those who are at increased risk for blood pressure    (  ) Colorectal Cancer: Screening --Adults aged 48 to 76 years  Grade: A (Recommended) recommends screening for colorectal cancer starting at age 48 years and continuing until age 76 years.      Lab Results   Component Value Date    CHOL 182 2022    CHOL 160 2021    CHOL 191 2021     Lab Results   Component Value Date    TRIG 93 2022    TRIG 67 2021    TRIG 63 2021     Lab Results   Component Value Date    HDL 65 2022    HDL 68 2021    HDL 88 2021     Lab Results   Component Value Date    LDLCALC 98 2022    LDLCALC 79 2021    [facility] 90 01/06/2021     Lab Results   Component Value Date    LABVLDL 19 01/11/2022    LABVLDL 13 07/02/2021    LABVLDL 13 01/06/2021     No results found for: CHOLHDLRATIO   (  )  Lipid Disorders in Adults: Screening -- Men 28 and Older  Grade: A (Recommended) recommends screening men aged 28 and older for lipid disorders. Alcohol Use: Not At Risk    Frequency of Alcohol Consumption: Never    Average Number of Drinks: Patient does not drink    Frequency of Binge Drinking: Never      ( ) Alcohol Misuse: Screening and Behavioral Counseling Interventions in Primary Care -- Adults  Grade: B (Recommended) recommends that clinicians screen adults aged 25 years or older for alcohol misuse and provide persons engaged in risky or hazardous drinking with brief behavioral counseling interventions to reduce alcohol misuse. Estimated body mass index is 22.03 kg/m² as calculated from the following:    Height as of this encounter: 5' 5\" (1.651 m). Weight as of this encounter: 132 lb 6.4 oz (60.1 kg). (  )  Obesity: Screening for and Management of-- All Adults  Grade: B(Recommended) recommends screening all adults for obesity. Clinicians should offer or refer patients with a body mass index (BMI) of 30 kg/m2 or higher to intensive, multicomponent behavioral interventions. (Not a fall risk)  Fall Prevention -- Exercise/Physical Therapy: Community-dwelling Adults 72 Years or Older, Increased Risk for Falls   Grade: B (Recommended) recommends exercise or physical therapy to prevent falls in community-dwelling adults aged 72 years or older who are at increased risk for falls. (No new symptoms noted or reported today)  Depression: Screening -- General adult population, including pregnant and postpartum women  Grade:  B(Recommended) recommends screening for depression in the general adult population,  Screening should be implemented with adequate systems in place to ensure accurate diagnosis, effective treatment, and appropriate follow-up. (  ) Glaucoma: Screening - Adults and Diabetic Eye Exam      (  ) Thyroid Dysfunction: Screening --      (  ) Vitamin D Deficiency: Screening --      (  ) Skin Cancer: Screening --Asymptomatic adults Grade: I(Uncertain)     (  ) Cervical Cancer: Screening -- Women 21 to 72 (Pap Smear) or 30-65 (in combo with HPV testing)  Grade: A(Recommended)    (Aged Out) Breast Cancer: Screening with Mammography-- Women aged 48 to 76 years  Grade: B (Recommended) recommends biennial screening mammography for women aged 48 to 76 years. (2020) Osteoporosis: Screening -- Women 65+ and Younger Women at Increased Risk  Grade: B(Recommended) recommends screening for osteoporosis in women aged 72 years or older and in younger women whose fracture risk is equal to or greater than that of a 42-year-old white woman who has no additional risk factors. Educational materials printed for patient's review and were included in patient instructions on her After Visit Summary and given to patient at the end of visit. Counseled regarding above diagnosis, including possible risks and complications,  especially if left uncontrolled. Counseled regarding the possible side effects, risks, benefits and alternatives to treatment; patient and/or guardian verbalizes understanding, agrees, feels comfortable with and wishes to proceed with above treatment plan. Advised patient to call with any new medication issues, and read all Rx info from pharmacy to assure aware of all possible risks and side effects of medication before taking. Reviewed age and gender appropriate health screening exams and vaccinations. Advised patient regarding importance of keeping up with recommended health maintenance and to schedule as soon as possible if overdue, as this is important in assessing for undiagnosed pathology, especially cancer, as well as protecting against potentially harmful/life threatening disease. Patient verbalizes understanding and agrees with above counseling, assessment and plan. All questions answered. On 08/08/22 I have spent 30 reviewing previous notes, test results and face to face with the patient discussing the diagnosis and importance of compliance with the treatment plan as well as documenting on the day of the visit. Educational materials exercises printed for patient's review and were included in patient instructions on their After Visit Summary and given to patient at the end of visit. Return in about 6 months (around 2/8/2023) for Routine Visit with Labs. Beatriz Hurst is a very pleasant 69-year-old white female who is very active for her age. She swims almost daily but has not been stretching. She is reporting some discomfort in the thoracic region and right lumbar region. She presents today for evaluation and management of chronic medical problems. Current medication list reviewed. The patient is tolerating all medications well without adverse events or known side effects. The patient does understand the risk and benefits of the prescribed medications. The patient is not up-to-date on all age-appropriate wellness issues. Patient denies any reccent hospitalizations or ER visit. Acute Complaints reported:    She continues to have knee pain bilaterally but it is too soon to give an injection we went over the x-rays and I explained to her that she probably needs to talk to orthopedics about the possibility of a total knee which she says she is too old for them does not really want to entertain that thought at this time.   She is not sure if the Voltaren gel is helping or not so she is going to stop that for a few days and see if that helps for if her knee pain is worse and she knows she needs to get back on      CHRONIC CONDITION:         HTN: Stable hypertension, controlled on    losartan (COZAAR) 100 MG tablet, TAKE 1 TABLET ONCE DAILY, Disp: 90 tablet, Rfl: 1   amLODIPine (NORVASC) 5 MG tablet, TAKE 1 TABLET DAILY, Disp: 90 tablet, Rfl: 1   hydroCHLOROthiazide (HYDRODIURIL) 25 MG tablet, Take 1 tablet by mouth daily, Disp: 90 tablet, Rfl: 1    vitamin E 400 UNIT capsule, Take by mouth, Disp: , Rfl:    aspirin 81 MG EC tablet, Take 81 mg by mouth daily, Disp: , Rfl: , remains at a mild intensity but overall good control, without symptoms, no ringing in the ears, no headaches and no nose bleeds. Better on medications. Hyperlipidemia: Mild in intensity but controlled on   rosuvastatin (CRESTOR) 10 MG tablet, TAKE 1 TABLET ONCE DAILY, Disp: 90 tablet, Rfl: 1  , without symptoms, no complications with dietary treatment regimen reporting no side effects or intolereances. Compliant with treatment and diet. No muscle aches, new joint pains or abd pain. Review of Systems   Constitutional:  Negative for activity change, chills, diaphoresis, fatigue, fever and unexpected weight change. HENT:  Negative for trouble swallowing and voice change. Eyes:  Negative for visual disturbance. Respiratory:  Negative for cough, chest tightness, shortness of breath and wheezing. Cardiovascular:  Negative for chest pain, palpitations and leg swelling. Gastrointestinal:  Negative for abdominal pain, blood in stool, constipation, diarrhea, nausea and vomiting. Endocrine: Negative for polydipsia, polyphagia and polyuria. Genitourinary:  Negative for dysuria, enuresis, frequency and hematuria. Musculoskeletal:  Positive for arthralgias, back pain and gait problem. Negative for joint swelling, myalgias and neck stiffness. Skin:  Negative for rash. Neurological:  Negative for dizziness, seizures, syncope, facial asymmetry, weakness, light-headedness, numbness and headaches. Hematological:  Does not bruise/bleed easily. Psychiatric/Behavioral:  Negative for behavioral problems, confusion, hallucinations and suicidal ideas. The patient is not nervous/anxious.         Current Outpatient Medications:     donepezil (ARICEPT) 5 MG tablet, Take 1 tablet by mouth nightly, Disp: 30 tablet, Rfl: 0    donepezil (ARICEPT) 10 MG tablet, Take 1 tablet by mouth nightly, Disp: 30 tablet, Rfl: 5    triamcinolone (KENALOG) 0.1 % cream, Apply topically 2 times daily. , Disp: 45 g, Rfl: 0    turmeric 500 MG CAPS, Take 500 mg by mouth as needed, Disp: , Rfl:     omeprazole (PRILOSEC) 40 MG delayed release capsule, Take 1 capsule by mouth daily, Disp: 90 capsule, Rfl: 1    raloxifene (EVISTA) 60 MG tablet, Take 1 tablet by mouth daily, Disp: 90 tablet, Rfl: 1    cyanocobalamin 1000 MCG/ML injection, INJECT 1 ML PER MONTH AS DIRECTED, Disp: 3 mL, Rfl: 2    losartan (COZAAR) 100 MG tablet, TAKE 1 TABLET ONCE DAILY, Disp: 90 tablet, Rfl: 1    amLODIPine (NORVASC) 5 MG tablet, TAKE 1 TABLET DAILY, Disp: 90 tablet, Rfl: 1    rosuvastatin (CRESTOR) 10 MG tablet, TAKE 1 TABLET ONCE DAILY (Patient taking differently: Take 5 mg by mouth in the morning.), Disp: 90 tablet, Rfl: 1    hydroCHLOROthiazide (HYDRODIURIL) 25 MG tablet, Take 1 tablet by mouth daily, Disp: 90 tablet, Rfl: 1    vitamin E 400 UNIT capsule, Take by mouth, Disp: , Rfl:     Calcium Carbonate-Vitamin D (OYSTER SHELL CALCIUM/D) 500-200 MG-UNIT TABS, Take by mouth, Disp: , Rfl:     aspirin 81 MG EC tablet, Take 81 mg by mouth daily, Disp: , Rfl:   No Known Allergies    Past Medical History:   Diagnosis Date    Diverticulosis     Hiatal hernia     Hyperlipidemia     Hypertension     Osteopenia     Varicosities of leg     Vitamin D deficiency      Past Surgical History:   Procedure Laterality Date    COLONOSCOPY      2012 AND 2015    TUMOR REMOVAL       Family History   Problem Relation Age of Onset    High Blood Pressure Mother     Heart Failure Mother     Other Father         PNEUMONIA    Elevated Lipids Sister     High Blood Pressure Sister     Cancer Brother     Cancer Son     Alcohol Abuse Son      Social History     Socioeconomic History Marital status:      Spouse name: Not on file    Number of children: Not on file    Years of education: Not on file    Highest education level: Not on file   Occupational History    Not on file   Tobacco Use    Smoking status: Never    Smokeless tobacco: Never   Substance and Sexual Activity    Alcohol use: Never    Drug use: Never    Sexual activity: Not on file   Other Topics Concern    Not on file   Social History Narrative    Not on file     Social Determinants of Health     Financial Resource Strain: Low Risk     Difficulty of Paying Living Expenses: Not hard at all   Food Insecurity: No Food Insecurity    Worried About Running Out of Food in the Last Year: Never true    Wes of Food in the Last Year: Never true   Transportation Needs: Not on file   Physical Activity: Sufficiently Active    Days of Exercise per Week: 5 days    Minutes of Exercise per Session: 60 min   Stress: Not on file   Social Connections: Not on file   Intimate Partner Violence: Not on file   Housing Stability: Not on file       Vitals:    08/08/22 1439   BP: 130/70   Site: Right Upper Arm   Position: Sitting   Cuff Size: Medium Adult   Pulse: 87   Resp: 20   Temp: 98.3 °F (36.8 °C)   TempSrc: Temporal   SpO2: 98%   Weight: 132 lb 6.4 oz (60.1 kg)   Height: 5' 5\" (1.651 m)       Physical Exam  Vitals and nursing note reviewed. Constitutional:       Appearance: Normal appearance. HENT:      Head: Normocephalic. Right Ear: Tympanic membrane and ear canal normal. There is no impacted cerumen. Left Ear: Tympanic membrane and ear canal normal. There is no impacted cerumen. Nose: Nose normal.      Mouth/Throat:      Mouth: Mucous membranes are dry. Eyes:      Extraocular Movements: Extraocular movements intact. Pupils: Pupils are equal, round, and reactive to light. Neck:      Vascular: No carotid bruit. Cardiovascular:      Rate and Rhythm: Normal rate and regular rhythm. Pulses: Normal pulses. Heart sounds: Normal heart sounds. No murmur heard. No friction rub. No gallop. Pulmonary:      Effort: Pulmonary effort is normal. No respiratory distress. Breath sounds: Normal breath sounds. No stridor. No wheezing, rhonchi or rales. Chest:      Chest wall: No tenderness. Abdominal:      General: Bowel sounds are normal. There is no distension. Palpations: Abdomen is soft. Musculoskeletal:         General: Tenderness present. No swelling, deformity or signs of injury. Cervical back: No rigidity. No muscular tenderness. Right lower leg: No edema. Left lower leg: No edema. Lymphadenopathy:      Cervical: No cervical adenopathy. Skin:     General: Skin is warm and dry. Capillary Refill: Capillary refill takes 2 to 3 seconds. Findings: No bruising, lesion or rash. Neurological:      General: No focal deficit present. Mental Status: She is alert and oriented to person, place, and time. Motor: No weakness. Gait: Gait abnormal.   Psychiatric:         Attention and Perception: Attention normal.         Mood and Affect: Mood normal.         Behavior: Behavior normal.         Thought Content: Thought content does not include homicidal or suicidal ideation. Thought content does not include homicidal or suicidal plan.             Seen By:  Jeremy Garcia, SCOOTER - CNP

## 2022-08-08 NOTE — PROGRESS NOTES
Medicare Annual Wellness Visit    Madhu Mccoy is here for Medicare AWV (Common body aches, nothing new)    Assessment & Plan   Initial Medicare annual wellness visit  Hypercholesterolemia  Diverticulosis  Cerebral atrophy ([address]  Primary hypertension  Chronic pain of both knees  Vitamin B deficiency  BMI 22.0-22.9, adult    Recommendations for Preventive Services Due: see orders and patient instructions/AVS.  Recommended screening schedule for the next 5-10 years is provided to the patient in written form: see Patient Instructions/AVS.     Return in 1 year (on 8/8/2023) for Medicare Annual Wellness Visit in 1 year. Subjective   The following acute and/or chronic problems were also addressed today:  Chronic muscle pain    Patient's complete Health Risk Assessment and screening values have been reviewed and are found in Flowsheets. The following problems were reviewed today and where indicated follow up appointments were made and/or referrals ordered.     Positive Risk Factor Screenings with Interventions:             General Health and ACP:  General  In general, how would you say your health is?: Good  In the past 7 days, have you experienced any of the following: New or Increased Pain, New or Increased Fatigue, Loneliness, Social Isolation, Stress or Anger?: (!) Yes  Select all that apply: (!) New or Increased Fatigue, New or Increased Pain  Do you get the social and emotional support that you need?: Yes  Do you have a Living Will?: Yes    Advance Directives       Power of  Living Will ACP-Advance Directive ACP-Power of     Not on File Not on File Not on File Not on File        General Health Risk Interventions:  No Living Will: Advance Care Planning addressed with patient today    Health Habits/Nutrition:  Physical Activity: Sufficiently Active    Days of Exercise per Week: 5 days    Minutes of Exercise per Session: 60 min     Have you lost any weight without trying in the past 3 months?: non-tender without mass, no thyromegaly or thyroid nodules, no cervical lymphadenopathy  Pulmonary/Chest: clear to auscultation bilaterally- no wheezes, rales or rhonchi, normal air movement, no respiratory distress  Cardiovascular: normal rate, regular rhythm, normal S1 and S2, no murmurs, rubs, clicks, or gallops, distal pulses intact, no carotid bruits  Abdomen: soft, non-tender, non-distended, normal bowel sounds, no masses or organomegaly  Extremities: no cyanosis, clubbing or edema  Musculoskeletal: normal range of motion, no joint swelling, deformity or tenderness  Neurologic: reflexes normal and symmetric, no cranial nerve deficit, gait, coordination and speech normal       No Known Allergies  Prior to Visit Medications    Medication Sig Taking? Authorizing Provider   triamcinolone (KENALOG) 0.1 % cream Apply topically 2 times daily.  Yes SCOOTER Hampton CNP   turmeric 500 MG CAPS Take 500 mg by mouth as needed Yes Historical Provider, MD   omeprazole (PRILOSEC) 40 MG delayed release capsule Take 1 capsule by mouth daily Yes SCOOTER Hampton CNP   raloxifene (EVISTA) 60 MG tablet Take 1 tablet by mouth daily Yes SCOOTER Hampton CNP   cyanocobalamin 1000 MCG/ML injection INJECT 1 ML PER MONTH AS DIRECTED Yes SCOOTER Hampton CNP   losartan (COZAAR) 100 MG tablet TAKE 1 TABLET ONCE DAILY Yes SCOOTER Hampton CNP   amLODIPine (NORVASC) 5 MG tablet TAKE 1 TABLET DAILY Yes SCOOTER Hampton CNP   rosuvastatin (CRESTOR) 10 MG tablet TAKE 1 TABLET ONCE DAILY Yes SCOOTER Hampton CNP   hydroCHLOROthiazide (HYDRODIURIL) 25 MG tablet Take 1 tablet by mouth daily Yes SCOOTER Hampton CNP   vitamin E 400 UNIT capsule Take by mouth Yes Historical Provider, MD   Calcium Carbonate-Vitamin D (OYSTER SHELL CALCIUM/D) 500-200 MG-UNIT TABS Take by mouth Yes Historical Provider, MD   aspirin 81 MG EC tablet Take 81 mg by mouth daily Yes Historical Provider, MD CareTeam (Including outside providers/suppliers regularly involved in providing care):   Patient Care Team:  SCOOTER Henderson CNP as PCP - General (Nurse Practitioner)  SCOOTER Henderson CNP as PCP - [facility] Empaneled Provider     Reviewed and updated this visit:  Tobacco  Allergies  Meds  Problems  Med Hx  Surg Hx  Soc Hx  Fam Hx             Cardiovascular Disease Risk Counseling: Assessed the patient's risk to develop cardiovascular disease and reviewed main risk factors. Reviewed steps to reduce disease risk including:   Quitting tobacco use, reducing amount smoked, or not starting the habit  Making healthy food choices  Being physically active and gradualy increasing activity levels   Reduce weight and determine a healthy BMI goal  Monitor blood pressure and treat if higher than 140/90 mmHg  Maintain blood total cholesterol levels under 5 mmol/l or 190 mg/dl  Maintain LDL cholesterol levels under 3.0 mmol/l or 115 mg/dl   Control blood glucose levels  Consider taking aspirin (75 mg daily), once blood pressure is controlled   Provided a follow up plan.   Time spent (minutes): 12

## 2022-08-09 LAB
ALBUMIN SERPL-MCNC: 4.1 G/DL (ref 3.5–5.2)
ALP BLD-CCNC: 65 U/L (ref 35–104)
ALT SERPL-CCNC: 19 U/L (ref 0–32)
ANION GAP SERPL CALCULATED.3IONS-SCNC: 11 MMOL/L (ref 7–16)
AST SERPL-CCNC: 15 U/L (ref 0–31)
BILIRUB SERPL-MCNC: 0.5 MG/DL (ref 0–1.2)
BUN BLDV-MCNC: 14 MG/DL (ref 6–23)
CALCIUM SERPL-MCNC: 10 MG/DL (ref 8.6–10.2)
CHLORIDE BLD-SCNC: 106 MMOL/L (ref 98–107)
CHOLESTEROL, TOTAL: 206 MG/DL (ref 0–199)
CO2: 24 MMOL/L (ref 22–29)
CREAT SERPL-MCNC: 0.8 MG/DL (ref 0.5–1)
GFR AFRICAN AMERICAN: >60
GFR NON-AFRICAN AMERICAN: >60 ML/MIN/1.73
GLUCOSE FASTING: 86 MG/DL (ref 74–99)
HDLC SERPL-MCNC: 84 MG/DL
LDL CHOLESTEROL CALCULATED: 104 MG/DL (ref 0–99)
POTASSIUM SERPL-SCNC: 3.8 MMOL/L (ref 3.5–5)
SODIUM BLD-SCNC: 141 MMOL/L (ref 132–146)
TOTAL PROTEIN: 7.2 G/DL (ref 6.4–8.3)
TRIGL SERPL-MCNC: 89 MG/DL (ref 0–149)
TSH SERPL DL<=0.05 MIU/L-ACNC: 1.82 UIU/ML (ref 0.27–4.2)
VLDLC SERPL CALC-MCNC: 18 MG/DL

## 2022-08-18 DIAGNOSIS — I10 PRIMARY HYPERTENSION: Primary | ICD-10-CM

## 2022-08-18 RX ORDER — AMLODIPINE BESYLATE 5 MG/1
5 TABLET ORAL DAILY
Qty: 90 TABLET | Refills: 1 | Status: SHIPPED | OUTPATIENT
Start: 2022-08-18 | End: 2023-02-14

## 2022-08-29 DIAGNOSIS — I10 PRIMARY HYPERTENSION: Primary | ICD-10-CM

## 2022-08-29 RX ORDER — HYDROCHLOROTHIAZIDE 25 MG/1
TABLET ORAL
Qty: 90 TABLET | Refills: 1 | Status: SHIPPED | OUTPATIENT
Start: 2022-08-29

## 2022-08-29 RX ORDER — CYANOCOBALAMIN 1000 UG/ML
INJECTION INTRAMUSCULAR; SUBCUTANEOUS
Qty: 3 ML | Refills: 1 | Status: SHIPPED | OUTPATIENT
Start: 2022-08-29

## 2022-08-29 NOTE — TELEPHONE ENCOUNTER
Patients last appointment 8/8/2022.   Patients next scheduled appointment   Future Appointments   Date Time Provider Rajwinder Castro   2/20/2023  9:30 AM [facility]   8/14/2023  9:30 AM Kaia Kilts, APRN - CNP [facility]

## 2022-09-14 RX ORDER — RALOXIFENE HYDROCHLORIDE 60 MG/1
TABLET, FILM COATED ORAL
Qty: 90 TABLET | Refills: 1 | Status: SHIPPED | OUTPATIENT
Start: 2022-09-14

## 2022-09-14 NOTE — TELEPHONE ENCOUNTER
Patients last appointment 8/8/2022.   Patients next scheduled appointment   Future Appointments   Date Time Provider Rajwinder Castro   2/20/2023  9:30 AM [facility]   8/14/2023  9:30 AM SCOOTER Vidal CNP [facility]

## 2022-09-27 DIAGNOSIS — I10 PRIMARY HYPERTENSION: Primary | ICD-10-CM

## 2022-09-27 RX ORDER — LOSARTAN POTASSIUM 100 MG/1
100 TABLET ORAL DAILY
Qty: 90 TABLET | Refills: 1 | Status: SHIPPED | OUTPATIENT
Start: 2022-09-27

## 2022-09-27 NOTE — TELEPHONE ENCOUNTER
Patients last appointment 8/8/2022.   Patients next scheduled appointment   Future Appointments   Date Time Provider Rajwinder Castro   2/20/2023  9:30 AM [facility]   8/14/2023  9:30 AM Thiago Rocks, APRN - CNP [facility]

## 2022-10-12 RX ORDER — DONEPEZIL HYDROCHLORIDE 5 MG/1
5 TABLET, FILM COATED ORAL NIGHTLY
Qty: 30 TABLET | Refills: 5 | Status: SHIPPED | OUTPATIENT
Start: 2022-10-12 | End: 2023-04-10

## 2022-10-12 NOTE — TELEPHONE ENCOUNTER
Patients last appointment 8/8/2022.   Patients next scheduled appointment   Future Appointments   Date Time Provider Rajwinder Castro   2/20/2023  9:30 AM [facility]COOTER Langston [facility]   8/14/2023  9:30 AM SCOOTER Langston CNP [facility]

## 2022-11-03 ENCOUNTER — OFFICE VISIT (OUTPATIENT)
Dept: PRIMARY CARE CLINIC | Age: 82
End: 2022-11-03
Payer: MEDICARE

## 2022-11-03 ENCOUNTER — TELEPHONE (OUTPATIENT)
Dept: PRIMARY CARE CLINIC | Age: 82
End: 2022-11-03

## 2022-11-03 VITALS
HEART RATE: 88 BPM | OXYGEN SATURATION: 96 % | WEIGHT: 134.6 LBS | RESPIRATION RATE: 19 BRPM | BODY MASS INDEX: 22.42 KG/M2 | DIASTOLIC BLOOD PRESSURE: 80 MMHG | SYSTOLIC BLOOD PRESSURE: 110 MMHG | HEIGHT: 65 IN | TEMPERATURE: 98 F

## 2022-11-03 DIAGNOSIS — J40 BRONCHITIS: Primary | ICD-10-CM

## 2022-11-03 DIAGNOSIS — R06.02 SHORTNESS OF BREATH: ICD-10-CM

## 2022-11-03 PROCEDURE — 1123F ACP DISCUSS/DSCN MKR DOCD: CPT | Performed by: NURSE PRACTITIONER

## 2022-11-03 PROCEDURE — 3078F DIAST BP <80 MM HG: CPT | Performed by: NURSE PRACTITIONER

## 2022-11-03 PROCEDURE — 99213 OFFICE O/P EST LOW 20 MIN: CPT | Performed by: NURSE PRACTITIONER

## 2022-11-03 PROCEDURE — 3074F SYST BP LT 130 MM HG: CPT | Performed by: NURSE PRACTITIONER

## 2022-11-03 RX ORDER — CEFDINIR 300 MG/1
300 CAPSULE ORAL 2 TIMES DAILY
Qty: 20 CAPSULE | Refills: 0 | Status: SHIPPED | OUTPATIENT
Start: 2022-11-03 | End: 2022-11-13

## 2022-11-03 RX ORDER — ALBUTEROL SULFATE 90 UG/1
2 AEROSOL, METERED RESPIRATORY (INHALATION) 4 TIMES DAILY PRN
Qty: 18 G | Refills: 0 | Status: SHIPPED | OUTPATIENT
Start: 2022-11-03

## 2022-11-03 RX ORDER — PREDNISONE 20 MG/1
40 TABLET ORAL DAILY
Qty: 10 TABLET | Refills: 0 | Status: SHIPPED | OUTPATIENT
Start: 2022-11-03 | End: 2022-11-08

## 2022-11-03 NOTE — PROGRESS NOTES
Chief Complaint   Shortness of Breath (Cough, chest discomfort, fatigue, started a week ago)      HPI   Source of history provided by: patient. Geoff Case is a 79 y.o. old female who presents to walk-in care for evaluation of chest congestion X 7 days. Associated symptoms include nasal congestion, rhinorrhea, cough, chest congestion, shortness of breath, and malaise. Since onset symptoms have been about the same. The patient is fully vaccinated. Has taken nothing at home with some symptomatic relief. Denies fever, chills, headache, sore throat, nausea, vomiting, lethargy, body aches, and otalgia. Pertinent PMH of: PMHpositive: nothing respiratory. Denies any PMH of URIhistory: COPD, asthma, recurrent bronchitis, and pneumonia. The patient has no history of tobacco abuse. ROS   Pertinent positives and negatives are stated within HPI, all other systems reviewed and are negative. Past Medical History:  has a past medical history of Diverticulosis, Hiatal hernia, Hyperlipidemia, Hypertension, Osteopenia, Varicosities of leg, and Vitamin D deficiency. Surgical History:  has a past surgical history that includes Colonoscopy and tumor removal.  Social History:  reports that she has never smoked. She has never used smokeless tobacco. She reports that she does not drink alcohol and does not use drugs. Family History: family history includes Alcohol Abuse in her son; Cancer in her brother and son; Elevated Lipids in her sister; Heart Failure in her mother; High Blood Pressure in her mother and sister; Other in her father. Allergies: Patient has no known allergies. Physical Exam      VS:  /80 (Site: Left Upper Arm, Position: Sitting, Cuff Size: Medium Adult)   Pulse 88   Temp 98 °F (36.7 °C) (Temporal)   Resp 19   Ht 5' 5\" (1.651 m)   Wt 134 lb 9.6 oz (61.1 kg)   SpO2 96%   BMI 22.40 kg/m²    Oxygen Saturation Interpretation: Normal.    Physical Exam  Vitals and nursing note reviewed. Constitutional:       Appearance: Normal appearance. She is normal weight. HENT:      Head: Normocephalic and atraumatic. Right Ear: Ear canal and external ear normal. No middle ear effusion. Left Ear: Ear canal and external ear normal.  No middle ear effusion. Nose: Congestion and rhinorrhea present. Right Turbinates: Not swollen or pale. Left Turbinates: Not swollen or pale. Right Sinus: No maxillary sinus tenderness or frontal sinus tenderness. Left Sinus: No maxillary sinus tenderness or frontal sinus tenderness. Comments: Clear post nasal drip     Mouth/Throat:      Mouth: Mucous membranes are moist.      Pharynx: Oropharynx is clear. Eyes:      Extraocular Movements: Extraocular movements intact. Conjunctiva/sclera: Conjunctivae normal.      Pupils: Pupils are equal, round, and reactive to light. Cardiovascular:      Rate and Rhythm: Normal rate and regular rhythm. Pulses: Normal pulses. Heart sounds: Normal heart sounds. Pulmonary:      Effort: Pulmonary effort is normal.      Breath sounds: Wheezing and rhonchi present. No rales. Abdominal:      General: Bowel sounds are normal.      Palpations: Abdomen is soft. Tenderness: There is no abdominal tenderness. Musculoskeletal:         General: Normal range of motion. Cervical back: Normal range of motion and neck supple. Skin:     General: Skin is warm and dry. Capillary Refill: Capillary refill takes less than 2 seconds. Neurological:      General: No focal deficit present. Mental Status: She is alert and oriented to person, place, and time. Psychiatric:         Mood and Affect: Mood normal.         Behavior: Behavior normal.         Thought Content:  Thought content normal.         Judgment: Judgment normal.         Lab / Imaging Results   (All laboratory and radiology results have been personally reviewed by myself)  Labs:  No results found for this visit on 11/03/22. Imaging: All Radiology results interpreted by Radiologist unless otherwise noted. No results found. Assessment/Plan  Lew Reyes was seen today for shortness of breath. Diagnoses and all orders for this visit:    Bronchitis  -     predniSONE (DELTASONE) 20 MG tablet; Take 2 tablets by mouth daily for 5 days  -     albuterol sulfate HFA (VENTOLIN HFA) 108 (90 Base) MCG/ACT inhaler; Inhale 2 puffs into the lungs 4 times daily as needed for Wheezing  -     cefdinir (OMNICEF) 300 MG capsule; Take 1 capsule by mouth 2 times daily for 10 days    Shortness of breath  -     XR CHEST STANDARD (2 VW); Future      Chest x-ray obtained in office, negative for any acute process. Increase fluids and rest.   Other symptomatic relief discussed including Tylenol prn pain/fever. Schedule f/u with PCP in 7-10 days if symptoms persist.  Go to ED sooner if symptoms worsen or change. ED immediately with high or refractory fever, progressive SOB, dyspnea, CP, calf pain/swelling, shaking chills, vomiting, abdominal pain, lethargy, flank pain, or decreased urinary output. Pt verbalizes understanding and is in agreement with plan of care. All questions answered. SCOOTER Álvarez NP    *NOTE: This report was transcribed using voice recognition software. Every effort was made to ensure accuracy; however, inadvertent computerized transcription errors may be present.

## 2022-11-03 NOTE — TELEPHONE ENCOUNTER
Ecc called in with patient on the other end complaining of prod. cough, chest congestion and tightness, runny nose, fatigue and diarrhea x 1 week. I advised she have someone bring her through [facility] and she verbalized her understanding.

## 2022-12-06 DIAGNOSIS — I10 PRIMARY HYPERTENSION: ICD-10-CM

## 2022-12-08 RX ORDER — HYDROCHLOROTHIAZIDE 25 MG/1
TABLET ORAL
Qty: 90 TABLET | Refills: 1 | Status: SHIPPED | OUTPATIENT
Start: 2022-12-08

## 2022-12-20 ENCOUNTER — OFFICE VISIT (OUTPATIENT)
Dept: PRIMARY CARE CLINIC | Age: 82
End: 2022-12-20
Payer: MEDICARE

## 2022-12-20 VITALS
RESPIRATION RATE: 18 BRPM | TEMPERATURE: 97.5 F | HEART RATE: 86 BPM | SYSTOLIC BLOOD PRESSURE: 110 MMHG | OXYGEN SATURATION: 98 % | DIASTOLIC BLOOD PRESSURE: 82 MMHG | BODY MASS INDEX: 22.16 KG/M2 | WEIGHT: 133 LBS | HEIGHT: 65 IN

## 2022-12-20 DIAGNOSIS — R06.2 WHEEZING: ICD-10-CM

## 2022-12-20 DIAGNOSIS — R09.89 CHEST CONGESTION: ICD-10-CM

## 2022-12-20 DIAGNOSIS — J06.9 UPPER RESPIRATORY TRACT INFECTION, UNSPECIFIED TYPE: Primary | ICD-10-CM

## 2022-12-20 LAB
INFLUENZA A ANTIBODY: NEGATIVE
INFLUENZA B ANTIBODY: NEGATIVE
Lab: NORMAL
PERFORMING INSTRUMENT: NORMAL
QC PASS/FAIL: NORMAL
SARS-COV-2, POC: NORMAL

## 2022-12-20 PROCEDURE — 3074F SYST BP LT 130 MM HG: CPT | Performed by: NURSE PRACTITIONER

## 2022-12-20 PROCEDURE — 87804 INFLUENZA ASSAY W/OPTIC: CPT | Performed by: NURSE PRACTITIONER

## 2022-12-20 PROCEDURE — 1123F ACP DISCUSS/DSCN MKR DOCD: CPT | Performed by: NURSE PRACTITIONER

## 2022-12-20 PROCEDURE — 87426 SARSCOV CORONAVIRUS AG IA: CPT | Performed by: NURSE PRACTITIONER

## 2022-12-20 PROCEDURE — 3078F DIAST BP <80 MM HG: CPT | Performed by: NURSE PRACTITIONER

## 2022-12-20 PROCEDURE — 99213 OFFICE O/P EST LOW 20 MIN: CPT | Performed by: NURSE PRACTITIONER

## 2022-12-20 RX ORDER — DONEPEZIL HYDROCHLORIDE 10 MG/1
10 TABLET, FILM COATED ORAL DAILY
COMMUNITY
Start: 2022-11-16

## 2022-12-20 RX ORDER — PREDNISONE 20 MG/1
20 TABLET ORAL 2 TIMES DAILY
Qty: 10 TABLET | Refills: 0 | Status: SHIPPED | OUTPATIENT
Start: 2022-12-20 | End: 2022-12-25

## 2022-12-20 RX ORDER — OMEPRAZOLE 40 MG/1
40 CAPSULE, DELAYED RELEASE ORAL DAILY
Qty: 90 CAPSULE | Refills: 1 | Status: SHIPPED | OUTPATIENT
Start: 2022-12-20

## 2022-12-20 RX ORDER — DOXYCYCLINE HYCLATE 100 MG
100 TABLET ORAL 2 TIMES DAILY
Qty: 20 TABLET | Refills: 0 | Status: SHIPPED | OUTPATIENT
Start: 2022-12-20 | End: 2022-12-30

## 2022-12-20 ASSESSMENT — PATIENT HEALTH QUESTIONNAIRE - PHQ9
SUM OF ALL RESPONSES TO PHQ QUESTIONS 1-9: 2
2. FEELING DOWN, DEPRESSED OR HOPELESS: 1
SUM OF ALL RESPONSES TO PHQ QUESTIONS 1-9: 2
SUM OF ALL RESPONSES TO PHQ9 QUESTIONS 1 & 2: 2
1. LITTLE INTEREST OR PLEASURE IN DOING THINGS: 1

## 2022-12-20 NOTE — PROGRESS NOTES
Chief Complaint   Other (Sob, runny and stuffed nose, clear nasal drg,diarrhea chest congestion x 5-6 days)      History of Present Illness   Source of history provided by:  patient and son     Ankita Hollis is a 79 y.o. old female who presents to the walk in clinic with complaints of Generalized Body Aches, Rhinorrhea, Nasal Congestion, Chest congestion, Shortness of breath, Diarrhea , and Fatigue x 5 days. States symptoms have stayed the same since onset. Has been taking no OTC medications. Denies any Fever, Nausea, Vomiting, Chest Pain, LE Edema, Abdominal Pain, Rash, or Close contact with a lab confirmed COVID-19 patient within 14 days of symptom onset . Denies any hx of asthma, COPD, emphysema, or pneumonia. ROS   Pertinent positives and negatives are stated within HPI, all other systems reviewed and are negative. Past Medical History:  has a past medical history of Diverticulosis, Hiatal hernia, Hyperlipidemia, Hypertension, Osteopenia, Varicosities of leg, and Vitamin D deficiency. Past Surgical History:  has a past surgical history that includes Colonoscopy and tumor removal.  Social History:  reports that she has never smoked. She has never used smokeless tobacco. She reports that she does not drink alcohol and does not use drugs. Family History: family history includes Alcohol Abuse in her son; Cancer in her brother and son; Elevated Lipids in her sister; Heart Failure in her mother; High Blood Pressure in her mother and sister; Other in her father. Allergies: Patient has no known allergies. Physical Exam   Vital Signs:  /82 (Site: Right Upper Arm, Position: Sitting, Cuff Size: Medium Adult)   Pulse 86   Temp 97.5 °F (36.4 °C)   Resp 18   Ht 5' 5\" (1.651 m)   Wt 133 lb (60.3 kg)   SpO2 98%   BMI 22.13 kg/m²    Oxygen Saturation Interpretation: Normal.    Constitutional:  Alert, development consistent with age. NAD. Moderate amount of swelling noted to bilateral nares.   Head: NC/NT. Airway patent. Ears: TMs clear bilaterally. Canals without exudate or swelling bilaterally. Mouth: Posterior pharynx with mild erythema and clear postnasal drip. No tonsillar hypertrophy or exudate. Neck:  Normal ROM. Supple. No anterior cervical adenopathy noted. Lungs: Congestion noted to posterior lung fields. Wheezing noted, but no rales or rhonchi. CV:  Regular rate and rhythm, normal heart sounds, without pathological murmurs, ectopy, gallops, or rubs. Skin:  Normal turgor. Warm, dry, without visible rash. Lymphatic: No lymphangitis or adenopathy noted. Neurological:  Oriented. Motor functions intact. Lab / Imaging Results   (All laboratory and radiology results have been personally reviewed by myself)  Labs:  Results for orders placed or performed in visit on 12/20/22   POCT COVID-19, Antigen   Result Value Ref Range    SARS-COV-2, POC Not-Detected Not Detected    Lot Number [id number]     QC Pass/Fail pass     Performing Instrument BD Veritor    POCT Influenza A/B   Result Value Ref Range    Influenza A Ab negative     Influenza B Ab negative        Imaging: All Radiology results interpreted by Radiologist unless otherwise noted. No results found. Medical Decision Making   Pt non-toxic, in no apparent distress and stable at time of discharge. Assessment/Plan   Jett Diane was seen today for other. Diagnoses and all orders for this visit:    Upper respiratory tract infection, unspecified type  -     doxycycline hyclate (VIBRA-TABS) 100 MG tablet; Take 1 tablet by mouth 2 times daily for 10 days    Wheezing  -     predniSONE (DELTASONE) 20 MG tablet; Take 1 tablet by mouth 2 times daily for 5 days    Chest congestion  -     POCT COVID-19, Antigen  -     POCT Influenza A/B  -     predniSONE (DELTASONE) 20 MG tablet; Take 1 tablet by mouth 2 times daily for 5 days      Rapid COVID-19 and influenza A/B swab obtained and noted to be negative.  Increase fluids and rest. Symptomatic relief discussed including Tylenol prn pain/fever. Schedule f/u with PCP in 7-10 days if symptoms persist. ED sooner if symptoms worsen or change. ED immediately with high or refractory fever, progressive SOB, dyspnea, CP, calf pain/swelling, shaking chills, vomiting, abdominal pain, lethargy, flank pain, or decreased urinary output. Pt verbalizes understanding and is in agreement with plan of care. All questions answered. SCOOTER Contreras - NP    This visit was provided as a focused evaluation during the [place] -19 pandemic/national emergency. A comprehensive review of all previous patient history and testing was not conducted. Pertinent findings were elicited during the visit. *NOTE: This report was transcribed using voice recognition software. Every effort was made to ensure accuracy; however, inadvertent computerized transcription errors may be present.

## 2023-01-17 RX ORDER — ROSUVASTATIN CALCIUM 10 MG/1
5 TABLET, COATED ORAL DAILY
Qty: 90 TABLET | Refills: 1 | Status: SHIPPED | OUTPATIENT
Start: 2023-01-17

## 2023-01-20 ENCOUNTER — OFFICE VISIT (OUTPATIENT)
Dept: PRIMARY CARE CLINIC | Age: 83
End: 2023-01-20

## 2023-01-20 VITALS
HEART RATE: 85 BPM | HEIGHT: 64 IN | DIASTOLIC BLOOD PRESSURE: 64 MMHG | OXYGEN SATURATION: 97 % | SYSTOLIC BLOOD PRESSURE: 98 MMHG | BODY MASS INDEX: 22.98 KG/M2 | WEIGHT: 134.6 LBS

## 2023-01-20 DIAGNOSIS — G31.9 CEREBRAL ATROPHY (HCC): ICD-10-CM

## 2023-01-20 DIAGNOSIS — E78.00 HYPERCHOLESTEROLEMIA: ICD-10-CM

## 2023-01-20 DIAGNOSIS — F33.0 MILD EPISODE OF RECURRENT MAJOR DEPRESSIVE DISORDER (HCC): ICD-10-CM

## 2023-01-20 DIAGNOSIS — F01.A18 MILD VASCULAR DEMENTIA WITH OTHER BEHAVIORAL DISTURBANCE: ICD-10-CM

## 2023-01-20 DIAGNOSIS — I67.9 CEREBROVASCULAR SMALL VESSEL DISEASE: ICD-10-CM

## 2023-01-20 DIAGNOSIS — F33.0 MAJOR DEPRESSIVE DISORDER, RECURRENT, MILD (HCC): ICD-10-CM

## 2023-01-20 DIAGNOSIS — Z13.29 SCREENING FOR THYROID DISORDER: ICD-10-CM

## 2023-01-20 DIAGNOSIS — F33.1 MAJOR DEPRESSIVE DISORDER, RECURRENT, MODERATE (HCC): ICD-10-CM

## 2023-01-20 DIAGNOSIS — I10 PRIMARY HYPERTENSION: Primary | ICD-10-CM

## 2023-01-20 PROBLEM — F33.9 MAJOR DEPRESSIVE DISORDER, RECURRENT, UNSPECIFIED (HCC): Status: ACTIVE | Noted: 2023-01-20

## 2023-01-20 RX ORDER — DONEPEZIL HYDROCHLORIDE 10 MG/1
10 TABLET, FILM COATED ORAL NIGHTLY
Qty: 30 TABLET | Refills: 5 | Status: SHIPPED | OUTPATIENT
Start: 2023-01-20

## 2023-01-20 RX ORDER — ESCITALOPRAM OXALATE 5 MG/1
5 TABLET ORAL DAILY
Qty: 30 TABLET | Refills: 5 | Status: SHIPPED | OUTPATIENT
Start: 2023-01-20

## 2023-01-20 RX ORDER — DONEPEZIL HYDROCHLORIDE 5 MG/1
5 TABLET, FILM COATED ORAL NIGHTLY
Qty: 30 TABLET | Refills: 0 | Status: SHIPPED | OUTPATIENT
Start: 2023-01-20

## 2023-01-20 ASSESSMENT — ENCOUNTER SYMPTOMS
NAUSEA: 0
CHEST TIGHTNESS: 0
VOICE CHANGE: 0
BACK PAIN: 0
VOMITING: 0
BLOOD IN STOOL: 0
SHORTNESS OF BREATH: 0
COUGH: 0
ABDOMINAL PAIN: 0
CONSTIPATION: 0
WHEEZING: 0
DIARRHEA: 0
TROUBLE SWALLOWING: 0

## 2023-01-20 ASSESSMENT — PATIENT HEALTH QUESTIONNAIRE - PHQ9
4. FEELING TIRED OR HAVING LITTLE ENERGY: 0
SUM OF ALL RESPONSES TO PHQ QUESTIONS 1-9: 7
2. FEELING DOWN, DEPRESSED OR HOPELESS: 3
SUM OF ALL RESPONSES TO PHQ QUESTIONS 1-9: 8
7. TROUBLE CONCENTRATING ON THINGS, SUCH AS READING THE NEWSPAPER OR WATCHING TELEVISION: 0
3. TROUBLE FALLING OR STAYING ASLEEP: 1
SUM OF ALL RESPONSES TO PHQ QUESTIONS 1-9: 8
10. IF YOU CHECKED OFF ANY PROBLEMS, HOW DIFFICULT HAVE THESE PROBLEMS MADE IT FOR YOU TO DO YOUR WORK, TAKE CARE OF THINGS AT HOME, OR GET ALONG WITH OTHER PEOPLE: 2
5. POOR APPETITE OR OVEREATING: 0
SUM OF ALL RESPONSES TO PHQ QUESTIONS 1-9: 8
9. THOUGHTS THAT YOU WOULD BE BETTER OFF DEAD, OR OF HURTING YOURSELF: 1
1. LITTLE INTEREST OR PLEASURE IN DOING THINGS: 3
8. MOVING OR SPEAKING SO SLOWLY THAT OTHER PEOPLE COULD HAVE NOTICED. OR THE OPPOSITE, BEING SO FIGETY OR RESTLESS THAT YOU HAVE BEEN MOVING AROUND A LOT MORE THAN USUAL: 0
6. FEELING BAD ABOUT YOURSELF - OR THAT YOU ARE A FAILURE OR HAVE LET YOURSELF OR YOUR FAMILY DOWN: 0
SUM OF ALL RESPONSES TO PHQ9 QUESTIONS 1 & 2: 6

## 2023-01-20 ASSESSMENT — COLUMBIA-SUICIDE SEVERITY RATING SCALE - C-SSRS
6. HAVE YOU EVER DONE ANYTHING, STARTED TO DO ANYTHING, OR PREPARED TO DO ANYTHING TO END YOUR LIFE?: NO
1. WITHIN THE PAST MONTH, HAVE YOU WISHED YOU WERE DEAD OR WISHED YOU COULD GO TO SLEEP AND NOT WAKE UP?: YES
2. HAVE YOU ACTUALLY HAD ANY THOUGHTS OF KILLING YOURSELF?: NO

## 2023-01-20 NOTE — PROGRESS NOTES
Celina Munoz : 1940 Sex: female  Age: 79 y.o. Chief Complaint   Patient presents with    Depression    Memory Loss       ASSESSMENT AND PLAN     Al Loja was seen today for depression and memory loss. 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 Imaging: All Radiology results interpreted by Radiologist unless otherwise noted. No results found. WAY FORWARD     Educational materials printed for patient's review and were included in patient instructions on her After Visit Summary and given to patient at the end of visit. Counseled regarding above diagnosis, including possible risks and complications,  especially if left uncontrolled. Counseled regarding the possible side effects, risks, benefits and alternatives to treatment; patient and/or guardian verbalizes understanding, agrees, feels comfortable with and wishes to proceed with above treatment plan.      Advised patient to call with any new medication issues, and read all Rx info from pharmacy to assure aware of all possible risks and side effects of medication before taking. Reviewed age and gender appropriate health screening exams and vaccinations. Advised patient regarding importance of keeping up with recommended health maintenance and to schedule as soon as possible if overdue, as this is important in assessing for undiagnosed pathology, especially cancer, as well as protecting against potentially harmful/life threatening disease. Patient verbalizes understanding and agrees with above counseling, assessment and plan. All questions answered. On 01/20/23 I have spent 30 reviewing previous notes, test results and face to face with the patient discussing the diagnosis and importance of compliance with the treatment plan as well as documenting on the day of the visit. Educational materials exercises printed for patient's review and were included in patient instructions on their After Visit Summary and given to patient at the end of visit. Return in about 8 weeks (around 3/17/2023), or cancell the Feb 20 appt, for Routine Visit with Labs. USPTF     No results found for: LABA1C  No results found for: EAG   Abnormal Blood Glucose and Type 2 Diabetes Mellitus: Screening -- Adults aged 36 to 79 years who are overweight or obese Grade: B (Recommended)    BP Readings from Last 3 Encounters:   01/20/23 98/64   12/20/22 110/82   11/03/22 110/80     High Blood Pressure: Screening and Home Monitoring -- Adults  Grade: A (Recommended) recommends screening for high blood pressure in ages 25 years or older. obtain measurements outside of the clinical setting for diagnostic confirmation before starting treatment. Annual screening for adults aged 36 years or older or those who are at increased risk for blood pressure    (  ) Colorectal Cancer: Screening --Adults aged 48 to 76 years  Grade: A (Recommended) recommends screening for colorectal cancer starting at age 48 years and continuing until age 76 years.      Lab Results Component Value Date    CHOL 206 (H) 08/08/2022    CHOL 182 01/11/2022    CHOL 160 07/02/2021     Lab Results   Component Value Date    TRIG 89 08/08/2022    TRIG 93 01/11/2022    TRIG 67 07/02/2021     Lab Results   Component Value Date    HDL 84 08/08/2022    HDL 65 01/11/2022    HDL 68 07/02/2021     Lab Results   Component Value Date    LDLCALC 104 (H) 08/08/2022    LDLCALC 98 01/11/2022    LDLCALC 79 07/02/2021     Lab Results   Component Value Date    LABVLDL 18 08/08/2022    LABVLDL 19 01/11/2022    LABVLDL 13 07/02/2021     No results found for: CHOLHDLRATIO   (  )  Lipid Disorders in Adults: Screening -- Men 28 and Older  Grade: A (Recommended) recommends screening men aged 28 and older for lipid disorders. Alcohol Use: Not At Risk    Frequency of Alcohol Consumption: Never    Average Number of Drinks: Patient does not drink    Frequency of Binge Drinking: Never      (  ) Alcohol Misuse: Screening and Behavioral Counseling Interventions in Primary Care -- Adults  Grade: B (Recommended) recommends that clinicians screen adults aged 25 years or older for alcohol misuse and provide persons engaged in risky or hazardous drinking with brief behavioral counseling interventions to reduce alcohol misuse. (  )  Lung Cancer: Screening -- Adults Ages 46-80 who have a 30 pack-year smoking history and currently smoke or have quit within the past 15 years  Grade: B (Recommended) recommends annual screening for lung cancer with low-dose computed tomography (LDCT) in adults aged 54 to [de-identified] years who have a 30 pack-year smoking history and currently smoke or have quit within the past 15 years. Screening should be discontinued once a person has not smoked for 15 years or develops a health problem that substantially limits life expectancy or the ability or willingness to have curative lung surgery.      Estimated body mass index is 23.1 kg/m² as calculated from the following:    Height as of this encounter: 5' 4\" (1.626 m). Weight as of this encounter: 134 lb 9.6 oz (61.1 kg). (  )  Obesity: Screening for and Management of-- All Adults  Grade: B(Recommended) recommends screening all adults for obesity. Clinicians should offer or refer patients with a body mass index (BMI) of 30 kg/m2 or higher to intensive, multicomponent behavioral interventions. (Not a fall risk)  Fall Prevention -- Exercise/Physical Therapy: Community-dwelling Adults 72 Years or Older, Increased Risk for Falls   Grade: B (Recommended) recommends exercise or physical therapy to prevent falls in community-dwelling adults aged 72 years or older who are at increased risk for falls. (No new symptoms noted or reported today)  Depression: Screening -- General adult population, including pregnant and postpartum women  Grade: B(Recommended) recommends screening for depression in the general adult population,  Screening should be implemented with adequate systems in place to ensure accurate diagnosis, effective treatment, and appropriate follow-up. (  ) Glaucoma: Screening - Adults and Diabetic Eye Exam      (  ) Thyroid Dysfunction: Screening --      (  ) Vitamin D Deficiency: Screening --      (  ) Skin Cancer: Screening --Asymptomatic adults Grade: I(Uncertain)     (  ) Cervical Cancer: Screening -- Women 21 to 72 (Pap Smear) or 30-65 (in combo with HPV testing)  Grade: A(Recommended)    (  ) Breast Cancer: Screening with Mammography-- Women aged 48 to 76 years  Grade: B (Recommended) recommends biennial screening mammography for women aged 48 to 76 years. (  ) Osteoporosis: Screening -- Women 65+ and Younger Women at Increased Risk  Grade: B(Recommended) recommends screening for osteoporosis in women aged 72 years or older and in younger women whose fracture risk is equal to or greater than that of a 59-year-old white woman who has no additional risk factors.        MONTY     Rosalio Capps was brought in today with her daughter who the entire family is reporting some memory issues and some depression. She denies any suicidal or homicidal ideation she just wants to lay up in the couch and sleep does not want to do anything anymore and there was a time when Clari Brooke was very active she would go swimming and she really enjoyed that but stopped doing it. There is also a memory issue I had put her on Aricept in August but somehow that fell off of her medication list and she has not been getting it or taking it. She did a Mini-Mental today and passed she had some issues with the memory but otherwise did well it is scanned in      CHRONIC CONDITIONS       HTN: Stable hypertension, controlled on   hydroCHLOROthiazide (HYDRODIURIL) 25 MG tablet, TAKE 1 TABLET DAILY, Disp: 90 tablet, Rfl: 1  losartan (COZAAR) 100 MG tablet, Take 1 tablet by mouth daily, Disp: 90 tablet, Rfl: 1  amLODIPine (NORVASC) 5 MG tablet, Take 1 tablet by mouth daily, Disp: 90 tablet, Rfl: 1  remains at a mild intensity but overall good control, without symptoms, no ringing in the ears, no headaches and no nose bleeds. Better on medications. Hyperlipidemia: Mild in intensity but controlled on   rosuvastatin (CRESTOR) 10 MG tablet, Take 0.5 tablets by mouth daily, Disp: 90 tablet, Rfl: 1 , without symptoms, no complications with dietary treatment regimen reporting no side effects or intolereances. Compliant with treatment and diet. No muscle aches, new joint pains or abd pain. Depression/Anxiety: Stable depression with generalized anxiety.  Mild in intensity but well controlled on    donepezil (ARICEPT) 5 MG tablet, Take 1 tablet by mouth nightly, Disp: 30 tablet, Rfl: 0  donepezil (ARICEPT) 10 MG tablet, Take 1 tablet by mouth nightly, Disp: 30 tablet, Rfl: 5  escitalopram (LEXAPRO) 5 MG tablet, Take 1 tablet by mouth daily, Disp: 30 tablet, Rfl: 5 , without symptoms, no weight gain, no increase in anxiety, no suicidal or homicidal ideation's no unexplained fatigue, or relationship difficulties relayed this visit. ROS     Review of Systems   Constitutional:  Negative for activity change, chills, diaphoresis, fatigue, fever and unexpected weight change. HENT:  Negative for trouble swallowing and voice change. Eyes:  Negative for visual disturbance. Respiratory:  Negative for cough, chest tightness, shortness of breath and wheezing. Cardiovascular:  Negative for chest pain, palpitations and leg swelling. Gastrointestinal:  Negative for abdominal pain, blood in stool, constipation, diarrhea, nausea and vomiting. Endocrine: Negative for polydipsia, polyphagia and polyuria. Genitourinary:  Negative for dysuria, enuresis, frequency and hematuria. Musculoskeletal:  Negative for arthralgias, back pain, gait problem, joint swelling, myalgias and neck stiffness. Skin:  Negative for rash. Neurological:  Negative for dizziness, seizures, syncope, facial asymmetry, weakness, light-headedness, numbness and headaches. Hematological:  Does not bruise/bleed easily. Psychiatric/Behavioral:  Negative for behavioral problems, confusion, hallucinations and suicidal ideas. The patient is not nervous/anxious.         Current Outpatient Medications:     donepezil (ARICEPT) 5 MG tablet, Take 1 tablet by mouth nightly, Disp: 30 tablet, Rfl: 0    donepezil (ARICEPT) 10 MG tablet, Take 1 tablet by mouth nightly, Disp: 30 tablet, Rfl: 5    escitalopram (LEXAPRO) 5 MG tablet, Take 1 tablet by mouth daily, Disp: 30 tablet, Rfl: 5    rosuvastatin (CRESTOR) 10 MG tablet, Take 0.5 tablets by mouth daily, Disp: 90 tablet, Rfl: 1    omeprazole (PRILOSEC) 40 MG delayed release capsule, Take 1 capsule by mouth daily, Disp: 90 capsule, Rfl: 1    hydroCHLOROthiazide (HYDRODIURIL) 25 MG tablet, TAKE 1 TABLET DAILY, Disp: 90 tablet, Rfl: 1    albuterol sulfate HFA (VENTOLIN HFA) 108 (90 Base) MCG/ACT inhaler, Inhale 2 puffs into the lungs 4 times daily as needed for Wheezing, Disp: 18 g, Rfl: 0    losartan (COZAAR) 100 MG tablet, Take 1 tablet by mouth daily, Disp: 90 tablet, Rfl: 1    raloxifene (EVISTA) 60 MG tablet, TAKE 1 TABLET DAILY, Disp: 90 tablet, Rfl: 1    amLODIPine (NORVASC) 5 MG tablet, Take 1 tablet by mouth daily, Disp: 90 tablet, Rfl: 1    Calcium Carbonate-Vitamin D (OYSTER SHELL CALCIUM/D) 500-200 MG-UNIT TABS, Take by mouth, Disp: , Rfl:     aspirin 81 MG EC tablet, Take 81 mg by mouth daily, Disp: , Rfl:   No Known Allergies    Past Medical History:   Diagnosis Date    Diverticulosis     Hiatal hernia     Hyperlipidemia     Hypertension     Osteopenia     Varicosities of leg     Vitamin D deficiency      Past Surgical History:   Procedure Laterality Date    COLONOSCOPY      2012 AND 2015    TUMOR REMOVAL       Family History   Problem Relation Age of Onset    High Blood Pressure Mother     Heart Failure Mother     Other Father         PNEUMONIA    Elevated Lipids Sister     High Blood Pressure Sister     Cancer Brother     Cancer Son     Alcohol Abuse Son      Social History     Socioeconomic History    Marital status:       Spouse name: Not on file    Number of children: Not on file    Years of education: Not on file    Highest education level: Not on file   Occupational History    Not on file   Tobacco Use    Smoking status: Never    Smokeless tobacco: Never   Substance and Sexual Activity    Alcohol use: Never    Drug use: Never    Sexual activity: Not on file   Other Topics Concern    Not on file   Social History Narrative    Not on file     Social Determinants of Health     Financial Resource Strain: Low Risk     Difficulty of Paying Living Expenses: Not hard at all   Food Insecurity: No Food Insecurity    Worried About Running Out of Food in the Last Year: Never true    Ran Out of Food in the Last Year: Never true   Transportation Needs: Not on file   Physical Activity: Sufficiently Active    Days of Exercise per Week: 5 days    Minutes of Exercise per Session: 60 min   Stress: Not on file   Social Connections: Not on file   Intimate Partner Violence: Not on file   Housing Stability: Not on file       Vitals:    01/20/23 1201   BP: 98/64   Pulse: 85   SpO2: 97%   Weight: 134 lb 9.6 oz (61.1 kg)   Height: 5' 4\" (1.626 m)       EXAM     Physical Exam  Vitals and nursing note reviewed. Constitutional:       Appearance: Normal appearance. HENT:      Head: Normocephalic. Right Ear: Tympanic membrane and ear canal normal. There is no impacted cerumen. Left Ear: Tympanic membrane and ear canal normal. There is no impacted cerumen. Nose: Nose normal.      Mouth/Throat:      Mouth: Mucous membranes are dry. Eyes:      Extraocular Movements: Extraocular movements intact. Pupils: Pupils are equal, round, and reactive to light. Neck:      Vascular: No carotid bruit. Cardiovascular:      Rate and Rhythm: Normal rate and regular rhythm. Pulses: Normal pulses. Heart sounds: Normal heart sounds. No murmur heard. No friction rub. No gallop. Pulmonary:      Effort: Pulmonary effort is normal. No respiratory distress. Breath sounds: Normal breath sounds. No stridor. No wheezing, rhonchi or rales. Chest:      Chest wall: No tenderness. Abdominal:      General: Bowel sounds are normal. There is no distension. Palpations: Abdomen is soft. Musculoskeletal:         General: No swelling, tenderness, deformity or signs of injury. Cervical back: No rigidity. No muscular tenderness. Right lower leg: No edema. Left lower leg: No edema. Lymphadenopathy:      Cervical: No cervical adenopathy. Skin:     General: Skin is warm and dry. Capillary Refill: Capillary refill takes 2 to 3 seconds. Findings: No bruising, lesion or rash. Neurological:      General: No focal deficit present. Mental Status: She is alert and oriented to person, place, and time. Motor: No weakness.       Gait: Gait normal.   Psychiatric:         Attention and Perception: Attention normal.         Mood and Affect: Mood normal.         Behavior: Behavior normal.         Thought Content: Thought content normal. Thought content does not include homicidal or suicidal ideation. Thought content does not include homicidal or suicidal plan.         Judgment: Judgment normal.      Comments: She doesOnly issues with memory have a diary which I encourage            Seen By:  SCOOTER Gtz - CNP  *NOTE: This report was transcribed using voice recognition software. Every effort was made to ensure accuracy; however, inadvertent computerized transcription errors may be present.

## 2023-01-24 ENCOUNTER — TELEPHONE (OUTPATIENT)
Dept: PRIMARY CARE CLINIC | Age: 83
End: 2023-01-24

## 2023-02-02 ENCOUNTER — TELEPHONE (OUTPATIENT)
Dept: PRIMARY CARE CLINIC | Age: 83
End: 2023-02-02

## 2023-02-02 DIAGNOSIS — E53.9 VITAMIN B DEFICIENCY: ICD-10-CM

## 2023-02-02 DIAGNOSIS — F33.0 MAJOR DEPRESSIVE DISORDER, RECURRENT, MILD (HCC): Primary | ICD-10-CM

## 2023-02-02 NOTE — TELEPHONE ENCOUNTER
Dr. Li Newer office called. In order for them to get patient scheduled from her referral they need a current b12 level. They are asking if you can order a new b12 level to be drawn. B12 order pendend.

## 2023-02-06 DIAGNOSIS — I10 PRIMARY HYPERTENSION: ICD-10-CM

## 2023-02-06 RX ORDER — AMLODIPINE BESYLATE 5 MG/1
TABLET ORAL
Qty: 90 TABLET | Refills: 1 | Status: SHIPPED | OUTPATIENT
Start: 2023-02-06

## 2023-02-06 NOTE — TELEPHONE ENCOUNTER
Patients last appointment 1/20/2023.   Patients next scheduled appointment   Future Appointments   Date Time Provider Rajwinder Castro   2/8/2023  2:00 PM Guicho Riley MD [facility]   3/24/2023  1:00 PM [facility]   8/14/2023  9:30 AM SCOOTER Wade CNP [facility]

## 2023-02-08 ENCOUNTER — INITIAL CONSULT (OUTPATIENT)
Dept: GERIATRIC MEDICINE | Age: 83
End: 2023-02-08
Payer: MEDICARE

## 2023-02-08 VITALS
DIASTOLIC BLOOD PRESSURE: 72 MMHG | BODY MASS INDEX: 24.61 KG/M2 | HEART RATE: 72 BPM | TEMPERATURE: 97.4 F | HEIGHT: 63 IN | WEIGHT: 138.9 LBS | SYSTOLIC BLOOD PRESSURE: 116 MMHG

## 2023-02-08 DIAGNOSIS — G31.84 MCI (MILD COGNITIVE IMPAIRMENT): Primary | ICD-10-CM

## 2023-02-08 PROCEDURE — 3074F SYST BP LT 130 MM HG: CPT | Performed by: INTERNAL MEDICINE

## 2023-02-08 PROCEDURE — 1123F ACP DISCUSS/DSCN MKR DOCD: CPT | Performed by: INTERNAL MEDICINE

## 2023-02-08 PROCEDURE — 99212 OFFICE O/P EST SF 10 MIN: CPT | Performed by: INTERNAL MEDICINE

## 2023-02-08 PROCEDURE — 99202 OFFICE O/P NEW SF 15 MIN: CPT | Performed by: INTERNAL MEDICINE

## 2023-02-08 PROCEDURE — 3078F DIAST BP <80 MM HG: CPT | Performed by: INTERNAL MEDICINE

## 2023-02-08 RX ORDER — CYANOCOBALAMIN 1000 UG/ML
1000 INJECTION, SOLUTION INTRAMUSCULAR; SUBCUTANEOUS
COMMUNITY

## 2023-02-08 NOTE — PATIENT INSTRUCTIONS
Our Office is MOVING! [facility], [facility]      With all future appointments starting February 27, 2023,   please see us at our [address]:     [address] [place] [address]. Please call our office with any questions. We look forward to caring for you at our new location. Thank you!

## 2023-02-08 NOTE — PROGRESS NOTES
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 \"I don't know what to say\"  Good hearing  Here with 2 Daughter in laws ,Desean Marc and Vanessa Scott.   Sleeps well  IADL's , drives short distances in town  Cooks simples stuff   Rep Q 2 to 3 times    and misplacing things  Impression; [facility]  Plan; Aricept titration to 10 mg and Lexapro titration to 10 mg in a month

## 2023-02-08 NOTE — PROGRESS NOTES
Chief Complaint   Patient presents with    Consultation     Referral from PCP, SCOOTER Tran. Pt states she lives alone & has some short term memory issues. States she may have \"a little bit of depression & is on meds for that\". Here with daughters-in-law Price Donovan. Per [facility], pt had the flu at [place] & was extremely dehydrated, was withdrawn & lethargic, did not remember being hospitalized. Per family, pt  had memory & depression issues before that. Pt states her   3 or 5 years ago & she has had issues since. Other     Family states pt had previously volunteered at [employer], went out with friends, went swimming. Since COVID started, her activities & desire to do things has lessened. Per Abhishek Arechiga, pt recognizes that she has some memory issues & that bothers her. Above issues discussed with Dr Gela Palacios prior to his seeing the pt.

## 2023-03-10 RX ORDER — RALOXIFENE HYDROCHLORIDE 60 MG/1
TABLET, FILM COATED ORAL
Qty: 90 TABLET | Refills: 1 | Status: SHIPPED | OUTPATIENT
Start: 2023-03-10

## 2023-03-10 NOTE — TELEPHONE ENCOUNTER
Patients last appointment 1/20/2023.   Patients next scheduled appointment   Future Appointments   Date Time Provider Rajwinder Castro   4/17/2023  1:45 PM SCOOTER Whitt CNP [facility]   5/30/2023  1:30 PM Mertie Ahumada, MD [facility]   8/14/2023  9:30 AM SCOOTER Whitt CNP [facility]

## 2023-03-25 DIAGNOSIS — I10 PRIMARY HYPERTENSION: ICD-10-CM

## 2023-03-27 RX ORDER — LOSARTAN POTASSIUM 100 MG/1
TABLET ORAL
Qty: 90 TABLET | Refills: 1 | Status: SHIPPED | OUTPATIENT
Start: 2023-03-27

## 2023-03-27 RX ORDER — CYANOCOBALAMIN 1000 UG/ML
INJECTION, SOLUTION INTRAMUSCULAR; SUBCUTANEOUS
Qty: 3 ML | Refills: 1 | Status: SHIPPED | OUTPATIENT
Start: 2023-03-27

## 2023-04-03 DIAGNOSIS — I10 PRIMARY HYPERTENSION: ICD-10-CM

## 2023-04-03 RX ORDER — LOSARTAN POTASSIUM 100 MG/1
100 TABLET ORAL DAILY
Qty: 30 TABLET | Refills: 0 | Status: SHIPPED | OUTPATIENT
Start: 2023-04-03

## 2023-04-03 NOTE — TELEPHONE ENCOUNTER
Patient is currently in [address] [place] [address], she is about to come back but needs medication prior. Sending a month refill. Patients last appointment 1/20/2023.   Patients next scheduled appointment   Future Appointments   Date Time Provider Rajwinder Gloria   4/17/2023  1:45 PM SCOOTER Carrion CNP [facility]   5/30/2023  1:30 PM Roula Taylor MD [facility]   8/14/2023  9:30 AM SCOOTER Carrion CNP [facility]

## 2023-04-17 ENCOUNTER — OFFICE VISIT (OUTPATIENT)
Dept: PRIMARY CARE CLINIC | Age: 83
End: 2023-04-17
Payer: MEDICARE

## 2023-04-17 VITALS
HEART RATE: 72 BPM | TEMPERATURE: 98 F | OXYGEN SATURATION: 99 % | RESPIRATION RATE: 20 BRPM | HEIGHT: 63 IN | SYSTOLIC BLOOD PRESSURE: 130 MMHG | WEIGHT: 139.8 LBS | DIASTOLIC BLOOD PRESSURE: 90 MMHG | BODY MASS INDEX: 24.77 KG/M2

## 2023-04-17 DIAGNOSIS — G31.9 CEREBRAL ATROPHY (HCC): ICD-10-CM

## 2023-04-17 DIAGNOSIS — M17.11 PRIMARY OSTEOARTHRITIS OF RIGHT KNEE: ICD-10-CM

## 2023-04-17 DIAGNOSIS — M17.0 PRIMARY OSTEOARTHRITIS OF BOTH KNEES: ICD-10-CM

## 2023-04-17 DIAGNOSIS — K57.90 DIVERTICULOSIS: ICD-10-CM

## 2023-04-17 DIAGNOSIS — E78.00 HYPERCHOLESTEROLEMIA: ICD-10-CM

## 2023-04-17 DIAGNOSIS — M25.562 CHRONIC PAIN OF BOTH KNEES: ICD-10-CM

## 2023-04-17 DIAGNOSIS — G89.29 CHRONIC PAIN OF BOTH KNEES: ICD-10-CM

## 2023-04-17 DIAGNOSIS — F33.1 MAJOR DEPRESSIVE DISORDER, RECURRENT, MODERATE (HCC): ICD-10-CM

## 2023-04-17 DIAGNOSIS — E53.9 VITAMIN B DEFICIENCY: ICD-10-CM

## 2023-04-17 DIAGNOSIS — I10 PRIMARY HYPERTENSION: ICD-10-CM

## 2023-04-17 DIAGNOSIS — M17.12 PRIMARY OSTEOARTHRITIS OF LEFT KNEE: Primary | ICD-10-CM

## 2023-04-17 DIAGNOSIS — M25.561 CHRONIC PAIN OF BOTH KNEES: ICD-10-CM

## 2023-04-17 PROCEDURE — 3080F DIAST BP >= 90 MM HG: CPT | Performed by: NURSE PRACTITIONER

## 2023-04-17 PROCEDURE — 3075F SYST BP GE 130 - 139MM HG: CPT | Performed by: NURSE PRACTITIONER

## 2023-04-17 PROCEDURE — 1123F ACP DISCUSS/DSCN MKR DOCD: CPT | Performed by: NURSE PRACTITIONER

## 2023-04-17 PROCEDURE — 99214 OFFICE O/P EST MOD 30 MIN: CPT | Performed by: NURSE PRACTITIONER

## 2023-04-17 RX ORDER — ESCITALOPRAM OXALATE 10 MG/1
10 TABLET ORAL DAILY
Qty: 90 TABLET | Refills: 1 | Status: SHIPPED | OUTPATIENT
Start: 2023-04-17

## 2023-04-17 RX ORDER — HYDROCHLOROTHIAZIDE 25 MG/1
25 TABLET ORAL DAILY
Qty: 90 TABLET | Refills: 1 | Status: SHIPPED | OUTPATIENT
Start: 2023-04-17 | End: 2023-10-14

## 2023-04-17 SDOH — ECONOMIC STABILITY: HOUSING INSECURITY
IN THE LAST 12 MONTHS, WAS THERE A TIME WHEN YOU DID NOT HAVE A STEADY PLACE TO SLEEP OR SLEPT IN A SHELTER (INCLUDING NOW)?: NO

## 2023-04-17 SDOH — ECONOMIC STABILITY: FOOD INSECURITY: WITHIN THE PAST 12 MONTHS, YOU WORRIED THAT YOUR FOOD WOULD RUN OUT BEFORE YOU GOT MONEY TO BUY MORE.: NEVER TRUE

## 2023-04-17 SDOH — ECONOMIC STABILITY: FOOD INSECURITY: WITHIN THE PAST 12 MONTHS, THE FOOD YOU BOUGHT JUST DIDN'T LAST AND YOU DIDN'T HAVE MONEY TO GET MORE.: NEVER TRUE

## 2023-04-17 SDOH — ECONOMIC STABILITY: INCOME INSECURITY: HOW HARD IS IT FOR YOU TO PAY FOR THE VERY BASICS LIKE FOOD, HOUSING, MEDICAL CARE, AND HEATING?: NOT HARD AT ALL

## 2023-04-17 ASSESSMENT — PATIENT HEALTH QUESTIONNAIRE - PHQ9
SUM OF ALL RESPONSES TO PHQ QUESTIONS 1-9: 3
10. IF YOU CHECKED OFF ANY PROBLEMS, HOW DIFFICULT HAVE THESE PROBLEMS MADE IT FOR YOU TO DO YOUR WORK, TAKE CARE OF THINGS AT HOME, OR GET ALONG WITH OTHER PEOPLE: 0
5. POOR APPETITE OR OVEREATING: 0
8. MOVING OR SPEAKING SO SLOWLY THAT OTHER PEOPLE COULD HAVE NOTICED. OR THE OPPOSITE, BEING SO FIGETY OR RESTLESS THAT YOU HAVE BEEN MOVING AROUND A LOT MORE THAN USUAL: 0
9. THOUGHTS THAT YOU WOULD BE BETTER OFF DEAD, OR OF HURTING YOURSELF: 0
4. FEELING TIRED OR HAVING LITTLE ENERGY: 1
3. TROUBLE FALLING OR STAYING ASLEEP: 0
SUM OF ALL RESPONSES TO PHQ QUESTIONS 1-9: 3
SUM OF ALL RESPONSES TO PHQ9 QUESTIONS 1 & 2: 2
6. FEELING BAD ABOUT YOURSELF - OR THAT YOU ARE A FAILURE OR HAVE LET YOURSELF OR YOUR FAMILY DOWN: 0
1. LITTLE INTEREST OR PLEASURE IN DOING THINGS: 1
SUM OF ALL RESPONSES TO PHQ QUESTIONS 1-9: 3
7. TROUBLE CONCENTRATING ON THINGS, SUCH AS READING THE NEWSPAPER OR WATCHING TELEVISION: 0
2. FEELING DOWN, DEPRESSED OR HOPELESS: 1
SUM OF ALL RESPONSES TO PHQ QUESTIONS 1-9: 3

## 2023-04-17 ASSESSMENT — ENCOUNTER SYMPTOMS
COUGH: 0
CHEST TIGHTNESS: 0
ABDOMINAL PAIN: 0
CONSTIPATION: 0
DIARRHEA: 0
NAUSEA: 0
VOMITING: 0
SHORTNESS OF BREATH: 0
BACK PAIN: 0
TROUBLE SWALLOWING: 0
BLOOD IN STOOL: 0
VOICE CHANGE: 0
WHEEZING: 0

## 2023-04-17 NOTE — PROGRESS NOTES
heard.    No friction rub. No gallop. Pulmonary:      Effort: Pulmonary effort is normal. No respiratory distress. Breath sounds: Normal breath sounds. No stridor. No wheezing, rhonchi or rales. Chest:      Chest wall: No tenderness. Abdominal:      General: Bowel sounds are normal. There is no distension. Palpations: Abdomen is soft. Musculoskeletal:         General: No swelling, tenderness, deformity or signs of injury. Cervical back: No rigidity. No muscular tenderness. Right lower leg: No edema. Left lower leg: No edema. Lymphadenopathy:      Cervical: No cervical adenopathy. Skin:     General: Skin is warm and dry. Capillary Refill: Capillary refill takes 2 to 3 seconds. Findings: No bruising, lesion or rash. Neurological:      General: No focal deficit present. Mental Status: She is alert and oriented to person, place, and time. Motor: No weakness. Gait: Gait normal.   Psychiatric:         Attention and Perception: Attention normal.         Mood and Affect: Mood normal.         Behavior: Behavior normal.         Thought Content: Thought content normal. Thought content does not include homicidal or suicidal ideation. Thought content does not include homicidal or suicidal plan. Judgment: Judgment normal.      Comments: She doesOnly issues with memory have a diary which I encourage            Seen By:  SCOOTER Montano CNP  *NOTE: This report was transcribed using voice recognition software. Every effort was made to ensure accuracy; however, inadvertent computerized transcription errors may be present.

## 2023-04-27 DIAGNOSIS — I10 PRIMARY HYPERTENSION: ICD-10-CM

## 2023-04-27 RX ORDER — LOSARTAN POTASSIUM 100 MG/1
TABLET ORAL
Qty: 30 TABLET | Refills: 0 | Status: SHIPPED | OUTPATIENT
Start: 2023-04-27

## 2023-05-24 ENCOUNTER — TELEPHONE (OUTPATIENT)
Dept: PHARMACY | Facility: CLINIC | Age: 83
End: 2023-05-24

## 2023-05-24 NOTE — TELEPHONE ENCOUNTER
[facility] CLINICAL PHARMACY: ADHERENCE REVIEW  Identified care gap per [employer]: fills at [facility]: ACE/ARB adherence    ASSESSMENT    ACE/ARB ADHERENCE    Insurance Records claims through 23 (Prior Year [place] = not reported; YTD Mathew Jeffrey = 71%; Potential Fail Date: 23):   LOSARTAN POT TAB 100MG last filled on 4/3/23 for 30 day supply. Next refill due: 5/3/23    Prescribed si tablet/capsule daily    Per Insurer Portal: RTS 23    BP Readings from Last 3 Encounters:   23 (!) 130/90   23 116/72   23 98/64     Estimated Creatinine Clearance: 47 mL/min (based on SCr of 0.8 mg/dL). Lab Results   Component Value Date    CREATININE 0.8 2023     Lab Results   Component Value Date    K 3.8 2023       PLAN  Per insurer report, LIS-0 - co-pays are based on tiers and patient is subject to coverage gap. Patient not found in Outcomes MTM    The following are interventions that have been identified:   Patient overdue refilling LOSARTAN POT TAB 100MG and active on home medication list.   Patient eligible for 100 day supply of LOSARTAN POT TAB 100MG    Attempting to reach patient to review. Left message asking for return call - to review if patient is still taking Losartan 100mg. Letter sent to patient. Last Visit: 23  Next Visit: 23    For Pharmacy Admin Tracking Only    Program: [facility] in place:  No  Gap Closed?: No   Time Spent (min): Jose Guadalupe [address].    [facility] free: [phone]

## 2023-05-30 ENCOUNTER — OFFICE VISIT (OUTPATIENT)
Dept: GERIATRIC MEDICINE | Age: 83
End: 2023-05-30
Payer: MEDICARE

## 2023-05-30 VITALS
HEART RATE: 72 BPM | BODY MASS INDEX: 25.56 KG/M2 | TEMPERATURE: 97.7 F | DIASTOLIC BLOOD PRESSURE: 76 MMHG | SYSTOLIC BLOOD PRESSURE: 112 MMHG | WEIGHT: 142 LBS | RESPIRATION RATE: 14 BRPM

## 2023-05-30 DIAGNOSIS — G31.84 MCI (MILD COGNITIVE IMPAIRMENT): Primary | ICD-10-CM

## 2023-05-30 PROCEDURE — 1123F ACP DISCUSS/DSCN MKR DOCD: CPT | Performed by: INTERNAL MEDICINE

## 2023-05-30 PROCEDURE — 3074F SYST BP LT 130 MM HG: CPT | Performed by: INTERNAL MEDICINE

## 2023-05-30 PROCEDURE — 3078F DIAST BP <80 MM HG: CPT | Performed by: INTERNAL MEDICINE

## 2023-05-30 PROCEDURE — 99212 OFFICE O/P EST SF 10 MIN: CPT | Performed by: INTERNAL MEDICINE

## 2023-07-26 DIAGNOSIS — G31.9 CEREBRAL ATROPHY (HCC): Primary | ICD-10-CM

## 2023-07-26 DIAGNOSIS — E78.00 HYPERCHOLESTEROLEMIA: Primary | ICD-10-CM

## 2023-07-26 DIAGNOSIS — I10 PRIMARY HYPERTENSION: ICD-10-CM

## 2023-07-26 RX ORDER — DONEPEZIL HYDROCHLORIDE 10 MG/1
10 TABLET, FILM COATED ORAL NIGHTLY
Qty: 90 TABLET | Refills: 1 | Status: SHIPPED | OUTPATIENT
Start: 2023-07-26

## 2023-07-26 RX ORDER — ROSUVASTATIN CALCIUM 10 MG/1
5 TABLET, COATED ORAL DAILY
Qty: 90 TABLET | Refills: 1 | Status: SHIPPED | OUTPATIENT
Start: 2023-07-26

## 2023-07-26 RX ORDER — AMLODIPINE BESYLATE 5 MG/1
TABLET ORAL
Qty: 90 TABLET | Refills: 1 | Status: SHIPPED | OUTPATIENT
Start: 2023-07-26

## 2023-07-26 RX ORDER — RALOXIFENE HYDROCHLORIDE 60 MG/1
TABLET, FILM COATED ORAL
Qty: 90 TABLET | Refills: 1 | Status: SHIPPED | OUTPATIENT
Start: 2023-07-26

## 2023-07-26 NOTE — TELEPHONE ENCOUNTER
Patients last appointment 4/17/2023.   Patients next scheduled appointment   Future Appointments   Date Time Provider [address]   10/2/2023  3:00 PM SCOOTER Mchugh - [facility]   12/1/2023  3:30 PM [facility]

## 2023-07-26 NOTE — TELEPHONE ENCOUNTER
Patients last appointment 4/17/2023.   Patients next scheduled appointment   Future Appointments   Date Time Provider [address]   10/2/2023  3:00 PM SCOOTER Bowser [facility]   12/1/2023  3:30 PM [facility]

## 2023-09-11 DIAGNOSIS — I10 PRIMARY HYPERTENSION: ICD-10-CM

## 2023-09-12 RX ORDER — LOSARTAN POTASSIUM 100 MG/1
100 TABLET ORAL DAILY
Qty: 90 TABLET | Refills: 1 | Status: SHIPPED | OUTPATIENT
Start: 2023-09-12

## 2023-09-12 RX ORDER — HYDROCHLOROTHIAZIDE 25 MG/1
25 TABLET ORAL DAILY
Qty: 90 TABLET | Refills: 1 | Status: SHIPPED | OUTPATIENT
Start: 2023-09-12

## 2023-09-12 NOTE — TELEPHONE ENCOUNTER
Handoff to receiving nurse was performed and questions were answered. Vital signs noted. Patient stable.     Last ov  05/30  Next ov  10/02

## 2023-10-02 ENCOUNTER — OFFICE VISIT (OUTPATIENT)
Dept: PRIMARY CARE CLINIC | Age: 83
End: 2023-10-02
Payer: MEDICARE

## 2023-10-02 VITALS
WEIGHT: 153.6 LBS | SYSTOLIC BLOOD PRESSURE: 100 MMHG | HEART RATE: 75 BPM | DIASTOLIC BLOOD PRESSURE: 60 MMHG | BODY MASS INDEX: 27.21 KG/M2 | HEIGHT: 63 IN | RESPIRATION RATE: 18 BRPM | OXYGEN SATURATION: 97 % | TEMPERATURE: 97.9 F

## 2023-10-02 DIAGNOSIS — F33.0 MAJOR DEPRESSIVE DISORDER, RECURRENT, MILD (HCC): ICD-10-CM

## 2023-10-02 DIAGNOSIS — E78.00 HYPERCHOLESTEROLEMIA: ICD-10-CM

## 2023-10-02 DIAGNOSIS — M17.11 PRIMARY OSTEOARTHRITIS OF RIGHT KNEE: ICD-10-CM

## 2023-10-02 DIAGNOSIS — Z23 IMMUNIZATION DUE: ICD-10-CM

## 2023-10-02 DIAGNOSIS — I10 PRIMARY HYPERTENSION: Primary | ICD-10-CM

## 2023-10-02 DIAGNOSIS — G31.9 CEREBRAL ATROPHY (HCC): ICD-10-CM

## 2023-10-02 DIAGNOSIS — Z13.29 SCREENING FOR THYROID DISORDER: ICD-10-CM

## 2023-10-02 DIAGNOSIS — M17.12 PRIMARY OSTEOARTHRITIS OF LEFT KNEE: ICD-10-CM

## 2023-10-02 DIAGNOSIS — K57.90 DIVERTICULOSIS: ICD-10-CM

## 2023-10-02 DIAGNOSIS — E53.9 VITAMIN B DEFICIENCY: ICD-10-CM

## 2023-10-02 PROCEDURE — 90694 VACC AIIV4 NO PRSRV 0.5ML IM: CPT | Performed by: NURSE PRACTITIONER

## 2023-10-02 PROCEDURE — 1123F ACP DISCUSS/DSCN MKR DOCD: CPT | Performed by: NURSE PRACTITIONER

## 2023-10-02 PROCEDURE — 99214 OFFICE O/P EST MOD 30 MIN: CPT | Performed by: NURSE PRACTITIONER

## 2023-10-02 PROCEDURE — 3074F SYST BP LT 130 MM HG: CPT | Performed by: NURSE PRACTITIONER

## 2023-10-02 PROCEDURE — 3078F DIAST BP <80 MM HG: CPT | Performed by: NURSE PRACTITIONER

## 2023-10-02 PROCEDURE — G0008 ADMIN INFLUENZA VIRUS VAC: HCPCS | Performed by: NURSE PRACTITIONER

## 2023-10-02 RX ORDER — ESCITALOPRAM OXALATE 10 MG/1
10 TABLET ORAL DAILY
Qty: 90 TABLET | Refills: 1 | Status: SHIPPED | OUTPATIENT
Start: 2023-10-02

## 2023-10-02 RX ORDER — DONEPEZIL HYDROCHLORIDE 10 MG/1
10 TABLET, FILM COATED ORAL NIGHTLY
Qty: 90 TABLET | Refills: 1 | Status: SHIPPED | OUTPATIENT
Start: 2023-10-02

## 2023-10-02 RX ORDER — AMLODIPINE BESYLATE 5 MG/1
5 TABLET ORAL DAILY
Qty: 90 TABLET | Refills: 1 | Status: SHIPPED | OUTPATIENT
Start: 2023-10-02

## 2023-10-02 ASSESSMENT — ENCOUNTER SYMPTOMS
VOMITING: 0
NAUSEA: 0
ABDOMINAL PAIN: 0
WHEEZING: 0
CONSTIPATION: 0
BACK PAIN: 0
SHORTNESS OF BREATH: 0
COUGH: 0
DIARRHEA: 0
CHEST TIGHTNESS: 0
VOICE CHANGE: 0
BLOOD IN STOOL: 0
TROUBLE SWALLOWING: 0

## 2023-10-02 NOTE — PROGRESS NOTES
Psychiatric:         Attention and Perception: Attention normal.         Mood and Affect: Mood normal.         Behavior: Behavior normal.         Thought Content: Thought content normal. Thought content does not include homicidal or suicidal ideation. Thought content does not include homicidal or suicidal plan. Judgment: Judgment normal.      Comments: She doesOnly issues with memory have a diary which I encourage            Seen By:  SCOOTER Mckeon CNP  *NOTE: This report was transcribed using voice recognition software. Every effort was made to ensure accuracy; however, inadvertent computerized transcription errors may be present.

## 2023-10-20 DIAGNOSIS — F33.0 MAJOR DEPRESSIVE DISORDER, RECURRENT, MILD (HCC): ICD-10-CM

## 2023-10-20 RX ORDER — ESCITALOPRAM OXALATE 10 MG/1
10 TABLET ORAL DAILY
Qty: 90 TABLET | Refills: 1 | Status: SHIPPED | OUTPATIENT
Start: 2023-10-20

## 2023-10-20 NOTE — TELEPHONE ENCOUNTER
Patients last appointment 10/2/2023.   Patients next scheduled appointment   Future Appointments   Date Time Provider [address]   12/1/2023  3:30 PM Ant Flanagan MD [facility]   4/8/2024  3:00 PM SCOOTER Taylor CNP [facility]   4/8/2024  3:30 PM SCOOTER Taylor CNP [facility]

## 2023-11-27 ENCOUNTER — OFFICE VISIT (OUTPATIENT)
Dept: GERIATRIC MEDICINE | Age: 83
End: 2023-11-27
Payer: MEDICARE

## 2023-11-27 VITALS
SYSTOLIC BLOOD PRESSURE: 90 MMHG | DIASTOLIC BLOOD PRESSURE: 60 MMHG | TEMPERATURE: 97.7 F | HEART RATE: 88 BPM | RESPIRATION RATE: 16 BRPM | BODY MASS INDEX: 28.04 KG/M2 | WEIGHT: 155.8 LBS

## 2023-11-27 DIAGNOSIS — G31.84 MCI (MILD COGNITIVE IMPAIRMENT): Primary | ICD-10-CM

## 2023-11-27 PROCEDURE — 3078F DIAST BP <80 MM HG: CPT | Performed by: INTERNAL MEDICINE

## 2023-11-27 PROCEDURE — 99213 OFFICE O/P EST LOW 20 MIN: CPT | Performed by: INTERNAL MEDICINE

## 2023-11-27 PROCEDURE — 3074F SYST BP LT 130 MM HG: CPT | Performed by: INTERNAL MEDICINE

## 2023-11-27 PROCEDURE — 1123F ACP DISCUSS/DSCN MKR DOCD: CPT | Performed by: INTERNAL MEDICINE

## 2023-11-27 NOTE — PROGRESS NOTES
CC Evaluate memory    Here with son Chandrika Ortiz from [place]  And still at home in [place]  May still be cooking and some domestic actvities  Doing all ADL's   And sleeping well  Has first alert device and knows how to use it  BP initial 90/x and later in exam , and lunch before visit  BP standing 90/x   Nighttime incontinence  Minicog improved  OA knees   Impression: OAB and HTN well controlled                      MCI same or better                      OA on gel and Tylenol  Plan: HCTZ to 12.5 mg a day           Other meds the same Aricept 10 mg and Dujbpte01 mg a day

## 2023-11-27 NOTE — PROGRESS NOTES
Chief Complaint   Patient presents with    6 Month Follow-Up     May follow up. Here with son Bennie Ling. Blood Pressure Check     90/60. Pt denies dizziness. On Losartan, HCTZ, & Amlodipine. Dr Jose Rafael Odom notified of above prior to his seeing the pt.

## 2023-12-08 RX ORDER — OMEPRAZOLE 40 MG/1
40 CAPSULE, DELAYED RELEASE ORAL DAILY
Qty: 90 CAPSULE | Refills: 1 | Status: SHIPPED | OUTPATIENT
Start: 2023-12-08

## 2023-12-08 NOTE — TELEPHONE ENCOUNTER
Patients last appointment 10/2/2023.   Patients next scheduled appointment   Future Appointments   Date Time Provider [address]   4/8/2024  3:00 PM Darral Siemens, APRN - CNP [facility]   4/8/2024  3:30 PM Darral Siemens, APRN - CNP [facility]   6/3/2024  3:00 PM [facility]

## 2024-01-23 DIAGNOSIS — G31.9 CEREBRAL ATROPHY (HCC): ICD-10-CM

## 2024-01-23 RX ORDER — DONEPEZIL HYDROCHLORIDE 10 MG/1
10 TABLET, FILM COATED ORAL NIGHTLY
Qty: 90 TABLET | Refills: 1 | Status: SHIPPED | OUTPATIENT
Start: 2024-01-23

## 2024-03-01 ENCOUNTER — PATIENT MESSAGE (OUTPATIENT)
Dept: GERIATRIC MEDICINE | Age: 84
End: 2024-03-01

## 2024-03-01 NOTE — TELEPHONE ENCOUNTER
From: Randi Salgado  To: Dr. Сергей Contreras  Sent: 3/1/2024 3:55 PM EST  Subject: knee pain    Hello Doc!  So, Mom (RANDI) has chronic knee pain. Wondering if there is a regiment you could provide that she could be taking something daily without adverse affect.  Thank you!  Gilbert

## 2024-03-15 DIAGNOSIS — I10 PRIMARY HYPERTENSION: ICD-10-CM

## 2024-03-15 RX ORDER — AMLODIPINE BESYLATE 5 MG/1
5 TABLET ORAL DAILY
Qty: 90 TABLET | Refills: 1 | Status: SHIPPED | OUTPATIENT
Start: 2024-03-15

## 2024-04-16 ENCOUNTER — OFFICE VISIT (OUTPATIENT)
Dept: PRIMARY CARE CLINIC | Age: 84
End: 2024-04-16
Payer: MEDICARE

## 2024-04-16 VITALS
HEART RATE: 75 BPM | SYSTOLIC BLOOD PRESSURE: 96 MMHG | HEIGHT: 63 IN | RESPIRATION RATE: 18 BRPM | OXYGEN SATURATION: 95 % | BODY MASS INDEX: 25.87 KG/M2 | DIASTOLIC BLOOD PRESSURE: 82 MMHG | TEMPERATURE: 98.5 F | WEIGHT: 146 LBS

## 2024-04-16 DIAGNOSIS — T14.8XXA SKIN ABRASION: ICD-10-CM

## 2024-04-16 DIAGNOSIS — L01.00 IMPETIGO: Primary | ICD-10-CM

## 2024-04-16 PROCEDURE — 3074F SYST BP LT 130 MM HG: CPT

## 2024-04-16 PROCEDURE — 3079F DIAST BP 80-89 MM HG: CPT

## 2024-04-16 PROCEDURE — 99213 OFFICE O/P EST LOW 20 MIN: CPT

## 2024-04-16 PROCEDURE — 1123F ACP DISCUSS/DSCN MKR DOCD: CPT

## 2024-04-16 ASSESSMENT — PATIENT HEALTH QUESTIONNAIRE - PHQ9
SUM OF ALL RESPONSES TO PHQ QUESTIONS 1-9: 4
2. FEELING DOWN, DEPRESSED OR HOPELESS: NOT AT ALL
8. MOVING OR SPEAKING SO SLOWLY THAT OTHER PEOPLE COULD HAVE NOTICED. OR THE OPPOSITE, BEING SO FIGETY OR RESTLESS THAT YOU HAVE BEEN MOVING AROUND A LOT MORE THAN USUAL: NOT AT ALL
SUM OF ALL RESPONSES TO PHQ9 QUESTIONS 1 & 2: 0
4. FEELING TIRED OR HAVING LITTLE ENERGY: NEARLY EVERY DAY
6. FEELING BAD ABOUT YOURSELF - OR THAT YOU ARE A FAILURE OR HAVE LET YOURSELF OR YOUR FAMILY DOWN: NOT AT ALL
5. POOR APPETITE OR OVEREATING: NOT AT ALL
SUM OF ALL RESPONSES TO PHQ QUESTIONS 1-9: 4
1. LITTLE INTEREST OR PLEASURE IN DOING THINGS: NOT AT ALL
SUM OF ALL RESPONSES TO PHQ QUESTIONS 1-9: 4
10. IF YOU CHECKED OFF ANY PROBLEMS, HOW DIFFICULT HAVE THESE PROBLEMS MADE IT FOR YOU TO DO YOUR WORK, TAKE CARE OF THINGS AT HOME, OR GET ALONG WITH OTHER PEOPLE: SOMEWHAT DIFFICULT
SUM OF ALL RESPONSES TO PHQ QUESTIONS 1-9: 4
7. TROUBLE CONCENTRATING ON THINGS, SUCH AS READING THE NEWSPAPER OR WATCHING TELEVISION: NOT AT ALL
3. TROUBLE FALLING OR STAYING ASLEEP: SEVERAL DAYS

## 2024-04-16 NOTE — PROGRESS NOTES
Chief Complaint   Abrasion      History of Present Illness   Source of history provided by:  patient and relative.      Randi Salgado is a 83 y.o. old female presenting to the walk in clinic with her son for evaluation of an erythematous lesion to the right cheek, which he noticed 7 days ago. Patient denies any known trauma to the area. Denies the area being warm to touch, painful, or swollen. Denies lymphangitic streaking. Denies any bleeding or active drainage. Patient denies taking anything OTC for symptomatic relief. Denies any fever, chills, generalized body aches, HA, recent illness, dental pain/infection, injuries, myalgias, arthralgias, nausea, vomiting, or lethargy.  Patient is unsure of tetanus status. Patient states that she has been constantly picking at the area.     ROS    Unless otherwise stated in this report or unable to obtain because of the patient's clinical or mental status as evidenced by the medical record, this patients's positive and negative responses for Review of Systems, constitutional, psych, eyes, ENT, cardiovascular, respiratory, gastrointestinal, neurological, genitourinary, musculoskeletal, integument systems and systems related to the presenting problem are either stated in the preceding or were not pertinent or were negative for the symptoms and/or complaints related to the medical problem.    Past Medical History:  has a past medical history of Diverticulosis, Hiatal hernia, Hyperlipidemia, Hypertension, Osteopenia, Varicosities of leg, and Vitamin D deficiency.  Past Surgical History:  has a past surgical history that includes Colonoscopy and tumor removal.  Social History:  reports that she has never smoked. She has never used smokeless tobacco. She reports that she does not drink alcohol and does not use drugs.  Family History: family history includes Alcohol Abuse in her son; Cancer in her brother and son; Elevated Lipids in her sister; Heart Failure in her mother; High

## 2024-04-22 DIAGNOSIS — F33.0 MAJOR DEPRESSIVE DISORDER, RECURRENT, MILD (HCC): ICD-10-CM

## 2024-04-22 RX ORDER — CYANOCOBALAMIN 1000 UG/ML
INJECTION, SOLUTION INTRAMUSCULAR; SUBCUTANEOUS
Qty: 3 EACH | Refills: 2 | Status: SHIPPED
Start: 2024-04-22 | End: 2024-04-26

## 2024-04-22 NOTE — TELEPHONE ENCOUNTER
Refill  Next bhaskar 4/26/24   Patient has not been on this in a long time pharmacy states patient requesting this ,called patient to verify no answer.

## 2024-04-23 RX ORDER — ESCITALOPRAM OXALATE 10 MG/1
10 TABLET ORAL DAILY
Qty: 90 TABLET | Refills: 1 | Status: SHIPPED | OUTPATIENT
Start: 2024-04-23

## 2024-04-23 NOTE — TELEPHONE ENCOUNTER
Patients last appointment 10/2/2023.  Patients next scheduled appointment   Future Appointments   Date Time Provider Department Center   4/26/2024  4:00 PM Remy Billy APRN - CNP [facility]   4/26/2024  4:15 PM Remy Billy APRN - CNP [facility]   6/3/2024  3:00 PM Сергей Contreras MD [facility]

## 2024-04-26 ENCOUNTER — OFFICE VISIT (OUTPATIENT)
Dept: PRIMARY CARE CLINIC | Age: 84
End: 2024-04-26

## 2024-04-26 VITALS
SYSTOLIC BLOOD PRESSURE: 100 MMHG | TEMPERATURE: 97.8 F | OXYGEN SATURATION: 97 % | WEIGHT: 147 LBS | HEART RATE: 77 BPM | RESPIRATION RATE: 16 BRPM | BODY MASS INDEX: 28.86 KG/M2 | HEIGHT: 60 IN | DIASTOLIC BLOOD PRESSURE: 62 MMHG

## 2024-04-26 VITALS
HEART RATE: 77 BPM | WEIGHT: 147 LBS | OXYGEN SATURATION: 97 % | TEMPERATURE: 97.8 F | RESPIRATION RATE: 16 BRPM | HEIGHT: 60 IN | SYSTOLIC BLOOD PRESSURE: 100 MMHG | DIASTOLIC BLOOD PRESSURE: 62 MMHG | BODY MASS INDEX: 28.86 KG/M2

## 2024-04-26 DIAGNOSIS — M17.12 PRIMARY OSTEOARTHRITIS OF LEFT KNEE: ICD-10-CM

## 2024-04-26 DIAGNOSIS — Z00.00 MEDICARE ANNUAL WELLNESS VISIT, SUBSEQUENT: Primary | ICD-10-CM

## 2024-04-26 DIAGNOSIS — I10 PRIMARY HYPERTENSION: ICD-10-CM

## 2024-04-26 DIAGNOSIS — F33.1 MAJOR DEPRESSIVE DISORDER, RECURRENT, MODERATE (HCC): ICD-10-CM

## 2024-04-26 DIAGNOSIS — G31.9 CEREBRAL ATROPHY (HCC): ICD-10-CM

## 2024-04-26 DIAGNOSIS — F01.A18 MILD VASCULAR DEMENTIA WITH OTHER BEHAVIORAL DISTURBANCE (HCC): Primary | ICD-10-CM

## 2024-04-26 DIAGNOSIS — M17.11 PRIMARY OSTEOARTHRITIS OF RIGHT KNEE: ICD-10-CM

## 2024-04-26 DIAGNOSIS — F33.0 MAJOR DEPRESSIVE DISORDER, RECURRENT, MILD (HCC): ICD-10-CM

## 2024-04-26 DIAGNOSIS — E78.00 HYPERCHOLESTEROLEMIA: ICD-10-CM

## 2024-04-26 DIAGNOSIS — F01.A18 MILD VASCULAR DEMENTIA WITH OTHER BEHAVIORAL DISTURBANCE (HCC): ICD-10-CM

## 2024-04-26 RX ORDER — MEMANTINE HYDROCHLORIDE 5 MG/1
5 TABLET ORAL 2 TIMES DAILY
Qty: 180 TABLET | Refills: 1 | Status: SHIPPED | OUTPATIENT
Start: 2024-04-26

## 2024-04-26 RX ORDER — MELOXICAM 7.5 MG/1
7.5 TABLET ORAL DAILY
Qty: 90 TABLET | Refills: 1 | Status: SHIPPED | OUTPATIENT
Start: 2024-04-26

## 2024-04-26 SDOH — ECONOMIC STABILITY: FOOD INSECURITY: WITHIN THE PAST 12 MONTHS, THE FOOD YOU BOUGHT JUST DIDN'T LAST AND YOU DIDN'T HAVE MONEY TO GET MORE.: NEVER TRUE

## 2024-04-26 SDOH — ECONOMIC STABILITY: FOOD INSECURITY: WITHIN THE PAST 12 MONTHS, YOU WORRIED THAT YOUR FOOD WOULD RUN OUT BEFORE YOU GOT MONEY TO BUY MORE.: NEVER TRUE

## 2024-04-26 SDOH — ECONOMIC STABILITY: INCOME INSECURITY: HOW HARD IS IT FOR YOU TO PAY FOR THE VERY BASICS LIKE FOOD, HOUSING, MEDICAL CARE, AND HEATING?: NOT HARD AT ALL

## 2024-04-26 ASSESSMENT — PATIENT HEALTH QUESTIONNAIRE - PHQ9
8. MOVING OR SPEAKING SO SLOWLY THAT OTHER PEOPLE COULD HAVE NOTICED. OR THE OPPOSITE, BEING SO FIGETY OR RESTLESS THAT YOU HAVE BEEN MOVING AROUND A LOT MORE THAN USUAL: NOT AT ALL
6. FEELING BAD ABOUT YOURSELF - OR THAT YOU ARE A FAILURE OR HAVE LET YOURSELF OR YOUR FAMILY DOWN: NOT AT ALL
2. FEELING DOWN, DEPRESSED OR HOPELESS: NOT AT ALL
9. THOUGHTS THAT YOU WOULD BE BETTER OFF DEAD, OR OF HURTING YOURSELF: NOT AT ALL
1. LITTLE INTEREST OR PLEASURE IN DOING THINGS: NOT AT ALL
SUM OF ALL RESPONSES TO PHQ QUESTIONS 1-9: 0
SUM OF ALL RESPONSES TO PHQ QUESTIONS 1-9: 0
10. IF YOU CHECKED OFF ANY PROBLEMS, HOW DIFFICULT HAVE THESE PROBLEMS MADE IT FOR YOU TO DO YOUR WORK, TAKE CARE OF THINGS AT HOME, OR GET ALONG WITH OTHER PEOPLE: NOT DIFFICULT AT ALL
SUM OF ALL RESPONSES TO PHQ QUESTIONS 1-9: 0
7. TROUBLE CONCENTRATING ON THINGS, SUCH AS READING THE NEWSPAPER OR WATCHING TELEVISION: NOT AT ALL
4. FEELING TIRED OR HAVING LITTLE ENERGY: NOT AT ALL
5. POOR APPETITE OR OVEREATING: NOT AT ALL
3. TROUBLE FALLING OR STAYING ASLEEP: NOT AT ALL
SUM OF ALL RESPONSES TO PHQ9 QUESTIONS 1 & 2: 0
SUM OF ALL RESPONSES TO PHQ QUESTIONS 1-9: 0

## 2024-04-26 ASSESSMENT — ENCOUNTER SYMPTOMS
ABDOMINAL PAIN: 0
NAUSEA: 0
SHORTNESS OF BREATH: 0
CHEST TIGHTNESS: 0
BACK PAIN: 0
DIARRHEA: 0
VOICE CHANGE: 0
COUGH: 0
WHEEZING: 0
TROUBLE SWALLOWING: 0
CONSTIPATION: 0
BLOOD IN STOOL: 0
VOMITING: 0

## 2024-04-26 NOTE — PROGRESS NOTES
Randi Salgado : 1940 Sex: female  Age: 83 y.o.    Chief Complaint   Patient presents with    Hypertension     6 month check up did not get labs done ,     Knee Pain     In bilateral knees        ASSESSMENT AND PLAN     Randi was seen today for hypertension and knee pain.    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    Imaging:  All Radiology results interpreted by Radiologist unless otherwise noted.  No results found.    WAY FORWARD     Educational materials printed for patient's review and were included in patient instructions on her After Visit Summary and given to patient at the end of visit.       Counseled regarding above diagnosis, including possible risks and complications,  especially if left uncontrolled.     Counseled regarding the possible side effects, risks, benefits and alternatives to treatment; patient and/or guardian verbalizes understanding, agrees, feels comfortable with and wishes to proceed with above treatment plan.     Advised patient to call with any new medication issues, and read all Rx info from pharmacy to assure aware of all possible risks and side effects of medication before taking.     Reviewed age and gender appropriate health screening exams and vaccinations.  Advised patient regarding importance of keeping up with recommended health

## 2024-04-26 NOTE — PROGRESS NOTES
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  Recommended screening schedule for the next 5-10 years is provided to the patient in written form: see Patient Instructions/AVS.     Return in 1 year (on 4/26/2025) for Medicare Annual Wellness Visit in 1 year.     Subjective   The following acute and/or chronic problems were also addressed today:  Dementia    Patient's complete Health Risk Assessment and screening values have been reviewed and are found in Flowsheets. The following problems were reviewed today and where indicated follow up appointments were made and/or referrals ordered.    Positive Risk Factor Screenings with Interventions:       Cognitive:   Clock Drawing Test (CDT): Normal  Words recalled: 0 Words Recalled  Total Score: (!) 2  Total Score Interpretation: Abnormal Mini-Cog  Interventions:  See AVS for additional education material           General HRA Questions:  Select all that apply: (!) New or Increased Pain (knee pain)    Pain Interventions:  See AVS for additional education material      Activity, Diet, and Weight:  On average, how many days per week do you engage in moderate to strenuous exercise (like a brisk walk)?: 0 days  On average, how many minutes do you engage in exercise at this level?: 0 min    Do you eat balanced/healthy meals regularly?: Yes    Body mass index is 28.47 kg/m².      Inactivity Interventions:  See AVS for additional education material          Vision Screen:  Do you have difficulty driving, watching TV, or doing any of your daily activities because of your eyesight?: No  Have you had an eye exam within the past

## 2024-05-31 RX ORDER — OMEPRAZOLE 40 MG/1
40 CAPSULE, DELAYED RELEASE ORAL DAILY
Qty: 90 CAPSULE | Refills: 1 | Status: SHIPPED | OUTPATIENT
Start: 2024-05-31

## 2024-06-03 RX ORDER — OMEPRAZOLE 40 MG/1
40 CAPSULE, DELAYED RELEASE ORAL DAILY
Qty: 90 CAPSULE | Refills: 1 | Status: SHIPPED | OUTPATIENT
Start: 2024-06-03

## 2024-06-10 NOTE — TELEPHONE ENCOUNTER
Geriatric Referral pended. .. Check and sign, Thanks.
I called back patient's son letting him know that provider is a Geriatric specialist not a therapist, so he says if he will call back to find out about a therapist for his mom.
Patient's son wants a referral for therapy due to patient's depression, discussed in recent appointment. They want to go to Dr. Rahat Turk in [place]. Pending referral for you. Forwarding message to  staff. Thanks.
0 (no pain/absence of nonverbal indicators of pain)

## 2024-07-01 ENCOUNTER — TELEPHONE (OUTPATIENT)
Dept: PRIMARY CARE CLINIC | Age: 84
End: 2024-07-01

## 2024-07-01 NOTE — TELEPHONE ENCOUNTER
Patients randall [place]? Called in requesting to see if PCP would review recent [facility] ED records and advise if patient needs to be seen for follow up, patient in ED for syncope.       Richard,   Please advise.

## 2024-07-02 NOTE — TELEPHONE ENCOUNTER
Relayed providers response to randall Hunt, confirmed he is not 100% sure of reasoning for syncope, stated that he arrived two hours prior to patient being in the ED, thinks maybe a stress response, dehydration, and or maybe a new reaction to patients current medication, and although no med changes made, not sure. Made ED follow up for patient on 7/15, requesting to see if PCP would want any potential lab work or other testing done prior to visit? Or wait for visit further discussion first?    Paul,   Please advise.

## 2024-07-15 ENCOUNTER — OFFICE VISIT (OUTPATIENT)
Dept: PRIMARY CARE CLINIC | Age: 84
End: 2024-07-15
Payer: MEDICARE

## 2024-07-15 VITALS
OXYGEN SATURATION: 96 % | DIASTOLIC BLOOD PRESSURE: 62 MMHG | TEMPERATURE: 97.9 F | BODY MASS INDEX: 29.72 KG/M2 | SYSTOLIC BLOOD PRESSURE: 110 MMHG | WEIGHT: 151.4 LBS | RESPIRATION RATE: 18 BRPM | HEIGHT: 60 IN | HEART RATE: 72 BPM

## 2024-07-15 DIAGNOSIS — F01.A18 MILD VASCULAR DEMENTIA WITH OTHER BEHAVIORAL DISTURBANCE (HCC): ICD-10-CM

## 2024-07-15 DIAGNOSIS — L01.00 IMPETIGO: ICD-10-CM

## 2024-07-15 DIAGNOSIS — I10 PRIMARY HYPERTENSION: ICD-10-CM

## 2024-07-15 DIAGNOSIS — F33.0 MAJOR DEPRESSIVE DISORDER, RECURRENT, MILD (HCC): ICD-10-CM

## 2024-07-15 DIAGNOSIS — R55 VASOVAGAL SYNCOPE: Primary | ICD-10-CM

## 2024-07-15 DIAGNOSIS — E78.00 HYPERCHOLESTEROLEMIA: ICD-10-CM

## 2024-07-15 PROCEDURE — 3074F SYST BP LT 130 MM HG: CPT | Performed by: NURSE PRACTITIONER

## 2024-07-15 PROCEDURE — 3078F DIAST BP <80 MM HG: CPT | Performed by: NURSE PRACTITIONER

## 2024-07-15 PROCEDURE — 99214 OFFICE O/P EST MOD 30 MIN: CPT | Performed by: NURSE PRACTITIONER

## 2024-07-15 PROCEDURE — 1123F ACP DISCUSS/DSCN MKR DOCD: CPT | Performed by: NURSE PRACTITIONER

## 2024-07-15 SDOH — ECONOMIC STABILITY: FOOD INSECURITY: WITHIN THE PAST 12 MONTHS, YOU WORRIED THAT YOUR FOOD WOULD RUN OUT BEFORE YOU GOT MONEY TO BUY MORE.: NEVER TRUE

## 2024-07-15 SDOH — ECONOMIC STABILITY: INCOME INSECURITY: HOW HARD IS IT FOR YOU TO PAY FOR THE VERY BASICS LIKE FOOD, HOUSING, MEDICAL CARE, AND HEATING?: NOT HARD AT ALL

## 2024-07-15 SDOH — ECONOMIC STABILITY: FOOD INSECURITY: WITHIN THE PAST 12 MONTHS, THE FOOD YOU BOUGHT JUST DIDN'T LAST AND YOU DIDN'T HAVE MONEY TO GET MORE.: NEVER TRUE

## 2024-07-15 ASSESSMENT — ENCOUNTER SYMPTOMS
VOICE CHANGE: 0
SHORTNESS OF BREATH: 0
DIARRHEA: 0
COUGH: 0
NAUSEA: 0
BACK PAIN: 0
VOMITING: 0
ABDOMINAL PAIN: 0
BLOOD IN STOOL: 0
TROUBLE SWALLOWING: 0
CHEST TIGHTNESS: 0
CONSTIPATION: 0
WHEEZING: 0

## 2024-07-15 NOTE — PROGRESS NOTES
Randi Salgado : 1940 Sex: female  Age: 83 y.o.    Chief Complaint   Patient presents with    Follow-up     Follow up from Er [address] for Syncope at [facility] , family has questions on why she was having seizure like symptoms        ASSESSMENT AND PLAN     Randi was seen today for follow-up.    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    Imaging:  All Radiology results interpreted by Radiologist unless otherwise noted.  No results found.    WAY FORWARD     Educational materials printed for patient's review and were included in patient instructions on her After Visit Summary and given to patient at the end of visit.       Counseled regarding above diagnosis, including possible risks and complications,  especially if left uncontrolled.     Counseled regarding the possible side effects, risks, benefits and alternatives to treatment; patient and/or guardian verbalizes understanding, agrees, feels comfortable with and wishes to proceed with above treatment plan.     Advised patient to call with any new medication issues, and read all Rx info from pharmacy to assure aware of all possible risks and side effects of medication before taking.     Reviewed age and gender appropriate health screening exams and vaccinations.  Advised patient regarding importance of keeping up with recommended health maintenance and to schedule as soon as possible if overdue, as this is important in assessing for undiagnosed pathology, especially cancer, as well as protecting against potentially harmful/life threatening disease.       Patient verbalizes understanding and

## 2024-07-19 DIAGNOSIS — G31.9 CEREBRAL ATROPHY (HCC): ICD-10-CM

## 2024-07-19 RX ORDER — DONEPEZIL HYDROCHLORIDE 10 MG/1
10 TABLET, FILM COATED ORAL NIGHTLY
Qty: 90 TABLET | Refills: 1 | Status: SHIPPED | OUTPATIENT
Start: 2024-07-19

## 2024-07-19 RX ORDER — RALOXIFENE HYDROCHLORIDE 60 MG/1
60 TABLET, FILM COATED ORAL DAILY
Qty: 90 TABLET | Refills: 1 | Status: SHIPPED | OUTPATIENT
Start: 2024-07-19 | End: 2025-01-15

## 2024-07-19 RX ORDER — ESCITALOPRAM OXALATE 10 MG/1
10 TABLET ORAL DAILY
COMMUNITY

## 2024-07-30 ENCOUNTER — TELEPHONE (OUTPATIENT)
Dept: FAMILY MEDICINE CLINIC | Age: 84
End: 2024-07-30

## 2024-07-30 NOTE — TELEPHONE ENCOUNTER
I received a paper today asking for clearance for Randi for a cleaning radiographs,filling, is she okay for this? Just needs signed. I can have another provider sign if so.

## 2024-08-02 ENCOUNTER — PATIENT MESSAGE (OUTPATIENT)
Dept: GERIATRIC MEDICINE | Age: 84
End: 2024-08-02

## 2024-08-02 NOTE — TELEPHONE ENCOUNTER
I called Addie back regarding my-chart message. We made the patients follow up appointment for 9/16. I did not reply through my-chart. I received new messages added to appointment request and called patient son Gilbert. Let him know I contacted Addie. He stated he will talk with Addie this weekend and get back to the office if the appointment needs rescheduled.

## 2024-08-02 NOTE — TELEPHONE ENCOUNTER
From: Randi Salgado  To: Dr. Сергей Contreras  Sent: 8/1/2024 5:42 AM EDT  Subject: Appointment Request    Appointment Request From: Randi Salgado    With Provider: Сергей Contreras MD [[facility]    Preferred Date Range: 8/27/2024 – 8/29/2024    Preferred Times: Any Time    Reason for visit: Request an Appointment    Comments:  General

## 2024-08-05 ENCOUNTER — PATIENT MESSAGE (OUTPATIENT)
Dept: GERIATRIC MEDICINE | Age: 84
End: 2024-08-05

## 2024-08-05 NOTE — TELEPHONE ENCOUNTER
From: Randi Salgado  To: Dr. Сергей Contreras  Sent: 8/5/2024 4:25 PM EDT  Subject: Mom's future.    Hello Doctor.    My brother, Addie, will be bringing Mom to her appointment on the 16th of September.     Beyond the basic checkup I'd ask for you to meet with her in the context of our \"next steps\". While she doesn't pose a threat to herself (not cooking, driving, etc) we're wondering if living alone is the best thing for her. She says that aging in place is what she wants, but, is that the best for her?    All this to say that AFTER her appointment we would love to have a phone conversation with you about your valued opinion.    We have visited [facility] and that would be our consideration.    Many thanks for your time and for caring for our mother!    Sincerely, Gilbert

## 2024-09-13 DIAGNOSIS — I10 PRIMARY HYPERTENSION: ICD-10-CM

## 2024-09-13 RX ORDER — AMLODIPINE BESYLATE 5 MG/1
5 TABLET ORAL DAILY
Qty: 90 TABLET | Refills: 1 | Status: SHIPPED | OUTPATIENT
Start: 2024-09-13

## 2024-09-16 ENCOUNTER — OFFICE VISIT (OUTPATIENT)
Dept: GERIATRIC MEDICINE | Age: 84
End: 2024-09-16
Payer: MEDICARE

## 2024-09-16 VITALS
DIASTOLIC BLOOD PRESSURE: 54 MMHG | WEIGHT: 148.1 LBS | SYSTOLIC BLOOD PRESSURE: 85 MMHG | BODY MASS INDEX: 28.9 KG/M2 | HEART RATE: 80 BPM | RESPIRATION RATE: 18 BRPM | TEMPERATURE: 97.9 F

## 2024-09-16 DIAGNOSIS — G31.84 MCI (MILD COGNITIVE IMPAIRMENT): Primary | ICD-10-CM

## 2024-09-16 PROCEDURE — 99213 OFFICE O/P EST LOW 20 MIN: CPT | Performed by: INTERNAL MEDICINE

## 2024-09-16 PROCEDURE — 3078F DIAST BP <80 MM HG: CPT | Performed by: INTERNAL MEDICINE

## 2024-09-16 PROCEDURE — 1123F ACP DISCUSS/DSCN MKR DOCD: CPT | Performed by: INTERNAL MEDICINE

## 2024-09-16 PROCEDURE — 3074F SYST BP LT 130 MM HG: CPT | Performed by: INTERNAL MEDICINE

## 2024-10-09 ENCOUNTER — TELEPHONE (OUTPATIENT)
Dept: PHARMACY | Facility: CLINIC | Age: 84
End: 2024-10-09

## 2024-10-09 NOTE — TELEPHONE ENCOUNTER
[facility] CLINICAL PHARMACY: ADHERENCE REVIEW  Identified care gap per [employer]: fills at [facility]: ACE/ARB and Statin adherence      ASSESSMENT    ACE/ARB ADHERENCE    Insurance Records claims through 24 (Prior Year PDC = 67% - FAILED; YTD PDC = 80%; Potential Fail Date: 10.13.24):   LOSARTAN POT TAB 100MG  last filled on 24 for 90 day supply. Next refill due: 08.10.24    Prescribed si tablet/capsule daily    Per Insurer Portal: last filled on same  No pharmacy outreach due to dose decreased and having surplus    BP Readings from Last 3 Encounters:   24 (!) 85/54   07/15/24 110/62   24 100/62     CrCl cannot be calculated (Patient's most recent lab result is older than the maximum 180 days allowed.).  Lab Results   Component Value Date    CREATININE 0.8 2023     Lab Results   Component Value Date    K 3.8 2023     STATIN ADHERENCE    Insurance Records claims through 24 (Prior Year PDC = 100% - PASSED ; YTD PDC = 100%; Potential Fail Date: 24):   ROSUVASTATIN TAB 10MG  last filled on 24 for 100 day supply. Next refill due: 24    Prescribed sig:  take 1/2 of tablet every day    Per Insurer Portal: last filled on same    Per [facility]:  no refills remaining.Their system is down and cannot contact pcp for refills.    Lab Results   Component Value Date    CHOL 207 (H) 2023    TRIG 97 2023    HDL 87 2023     Lab Results   Component Value Date     (H) 2023      ALT   Date Value Ref Range Status   2023 26 0 - 32 U/L Final     AST   Date Value Ref Range Status   2023 19 0 - 31 U/L Final     The ASCVD Risk score (Varun DK, et al., 2019) failed to calculate for the following reasons:    The 2019 ASCVD risk score is only valid for ages 40 to 79         The following are interventions that have been identified:   Patient OVERDUE refilling Losartan and Rosuvastatin and active on home medication list.

## 2024-10-25 ENCOUNTER — OFFICE VISIT (OUTPATIENT)
Dept: PRIMARY CARE CLINIC | Age: 84
End: 2024-10-25

## 2024-10-25 VITALS
TEMPERATURE: 97.8 F | SYSTOLIC BLOOD PRESSURE: 95 MMHG | DIASTOLIC BLOOD PRESSURE: 62 MMHG | HEART RATE: 85 BPM | HEIGHT: 60 IN | BODY MASS INDEX: 29.64 KG/M2 | RESPIRATION RATE: 16 BRPM | WEIGHT: 151 LBS | OXYGEN SATURATION: 98 %

## 2024-10-25 DIAGNOSIS — G31.9 CEREBRAL ATROPHY (HCC): ICD-10-CM

## 2024-10-25 DIAGNOSIS — F01.A18 MILD VASCULAR DEMENTIA WITH OTHER BEHAVIORAL DISTURBANCE (HCC): ICD-10-CM

## 2024-10-25 DIAGNOSIS — E55.9 VITAMIN D DEFICIENCY: ICD-10-CM

## 2024-10-25 DIAGNOSIS — I10 PRIMARY HYPERTENSION: Primary | ICD-10-CM

## 2024-10-25 DIAGNOSIS — Z13.29 SCREENING FOR THYROID DISORDER: ICD-10-CM

## 2024-10-25 DIAGNOSIS — E78.00 HYPERCHOLESTEROLEMIA: ICD-10-CM

## 2024-10-25 RX ORDER — ESCITALOPRAM OXALATE 10 MG/1
10 TABLET ORAL DAILY
Qty: 90 TABLET | Refills: 1 | Status: SHIPPED | OUTPATIENT
Start: 2024-10-25

## 2024-10-25 RX ORDER — MEMANTINE HYDROCHLORIDE 5 MG/1
5 TABLET ORAL 2 TIMES DAILY
Qty: 180 TABLET | Refills: 1 | Status: SHIPPED | OUTPATIENT
Start: 2024-10-25

## 2024-10-25 RX ORDER — RALOXIFENE HYDROCHLORIDE 60 MG/1
60 TABLET, FILM COATED ORAL DAILY
Qty: 90 TABLET | Refills: 3 | Status: SHIPPED | OUTPATIENT
Start: 2024-10-25 | End: 2025-10-20

## 2024-10-25 RX ORDER — DONEPEZIL HYDROCHLORIDE 10 MG/1
10 TABLET, FILM COATED ORAL NIGHTLY
Qty: 90 TABLET | Refills: 3 | Status: SHIPPED | OUTPATIENT
Start: 2024-10-25

## 2024-10-25 RX ORDER — MELOXICAM 7.5 MG/1
7.5 TABLET ORAL DAILY
Qty: 90 TABLET | Refills: 1 | Status: SHIPPED | OUTPATIENT
Start: 2024-10-25

## 2024-10-25 SDOH — ECONOMIC STABILITY: INCOME INSECURITY: HOW HARD IS IT FOR YOU TO PAY FOR THE VERY BASICS LIKE FOOD, HOUSING, MEDICAL CARE, AND HEATING?: NOT VERY HARD

## 2024-10-25 SDOH — ECONOMIC STABILITY: FOOD INSECURITY: WITHIN THE PAST 12 MONTHS, THE FOOD YOU BOUGHT JUST DIDN'T LAST AND YOU DIDN'T HAVE MONEY TO GET MORE.: NEVER TRUE

## 2024-10-25 SDOH — ECONOMIC STABILITY: FOOD INSECURITY: WITHIN THE PAST 12 MONTHS, YOU WORRIED THAT YOUR FOOD WOULD RUN OUT BEFORE YOU GOT MONEY TO BUY MORE.: NEVER TRUE

## 2024-10-25 ASSESSMENT — PATIENT HEALTH QUESTIONNAIRE - PHQ9
SUM OF ALL RESPONSES TO PHQ QUESTIONS 1-9: 0
SUM OF ALL RESPONSES TO PHQ9 QUESTIONS 1 & 2: 0
SUM OF ALL RESPONSES TO PHQ QUESTIONS 1-9: 0
10. IF YOU CHECKED OFF ANY PROBLEMS, HOW DIFFICULT HAVE THESE PROBLEMS MADE IT FOR YOU TO DO YOUR WORK, TAKE CARE OF THINGS AT HOME, OR GET ALONG WITH OTHER PEOPLE: NOT DIFFICULT AT ALL
2. FEELING DOWN, DEPRESSED OR HOPELESS: NOT AT ALL
3. TROUBLE FALLING OR STAYING ASLEEP: NOT AT ALL
7. TROUBLE CONCENTRATING ON THINGS, SUCH AS READING THE NEWSPAPER OR WATCHING TELEVISION: NOT AT ALL
5. POOR APPETITE OR OVEREATING: NOT AT ALL
8. MOVING OR SPEAKING SO SLOWLY THAT OTHER PEOPLE COULD HAVE NOTICED. OR THE OPPOSITE, BEING SO FIGETY OR RESTLESS THAT YOU HAVE BEEN MOVING AROUND A LOT MORE THAN USUAL: NOT AT ALL
SUM OF ALL RESPONSES TO PHQ QUESTIONS 1-9: 0
SUM OF ALL RESPONSES TO PHQ QUESTIONS 1-9: 0
4. FEELING TIRED OR HAVING LITTLE ENERGY: NOT AT ALL
9. THOUGHTS THAT YOU WOULD BE BETTER OFF DEAD, OR OF HURTING YOURSELF: NOT AT ALL
1. LITTLE INTEREST OR PLEASURE IN DOING THINGS: NOT AT ALL
6. FEELING BAD ABOUT YOURSELF - OR THAT YOU ARE A FAILURE OR HAVE LET YOURSELF OR YOUR FAMILY DOWN: NOT AT ALL

## 2024-10-25 ASSESSMENT — ENCOUNTER SYMPTOMS
WHEEZING: 0
BACK PAIN: 0
SHORTNESS OF BREATH: 0
ABDOMINAL PAIN: 0
BLOOD IN STOOL: 0
NAUSEA: 0
CONSTIPATION: 0
DIARRHEA: 0
COUGH: 0
VOICE CHANGE: 0
VOMITING: 0
CHEST TIGHTNESS: 0
TROUBLE SWALLOWING: 0

## 2024-10-25 NOTE — PROGRESS NOTES
Randi Salgado : 1940 Sex: female  Age: 84 y.o.    Chief Complaint   Patient presents with    Follow-up     3 month check up , due for labs wants orders to go to [facility] , refused flu vacine       ASSESSMENT AND PLAN     Randi was seen today for follow-up.    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    Imaging:  All Radiology results interpreted by Radiologist unless otherwise noted.  No results found.    WAY FORWARD     Educational materials printed for patient's review and were included in patient instructions on her After Visit Summary and given to patient at the end of visit.       Counseled regarding above diagnosis, including possible risks and complications,  especially if left uncontrolled.     Counseled regarding the possible side effects, risks, benefits and alternatives to treatment; patient and/or guardian verbalizes understanding, agrees, feels comfortable with and wishes to proceed with above treatment plan.     Advised patient to call with any new medication issues, and read all Rx info from pharmacy to assure aware of all possible risks and side effects of medication before taking.     Reviewed age and gender appropriate health screening exams and

## 2024-11-19 DIAGNOSIS — E78.00 HYPERCHOLESTEROLEMIA: ICD-10-CM

## 2024-11-19 RX ORDER — ROSUVASTATIN CALCIUM 10 MG/1
5 TABLET, COATED ORAL DAILY
Qty: 50 TABLET | Refills: 0 | Status: SHIPPED | OUTPATIENT
Start: 2024-11-19

## 2024-11-19 NOTE — TELEPHONE ENCOUNTER
Remy Billy, APRN - CNP, patient eligible for up to 100-day supply, if appropriate. Rx(s) pended for your signature/modification as appropriate    Last Visit: 10/25/24  Next Visit: 4/25/25    Thank you,  Katharina Gaviria, PharmD, [facility]  Population Health Pharmacist  [facility] Clinical Pharmacy  Department, toll free: [phone], option 1

## 2024-11-19 NOTE — TELEPHONE ENCOUNTER
POPULATION HEALTH CLINICAL PHARMACY: ADHERENCE REVIEW  Identified care gap per [employer]: fills at [facility]: Statin adherence    Patient also appears to be prescribed: ACE/ARB (failed measure year, as per prev notes, decreased BP meds/dose, surplus)    ASSESSMENT  STATIN ADHERENCE    Insurance Records claims through 11/8/24 (Prior Year PDC = 100% - PASSED ; YTD PDC = 83%; Potential Fail Date: 11/27/24):   ROSUVASTATIN TAB 10MG last filled on 6/6/24 for 100 day supply. Next refill due: 9/30/24    Prescribed sig:  HALF tab daily    Per Insurer Portal: may have/had surplus? End of year 2023, next refill was due @5/21/24 - then 100ds fills 3/14/24 and 6/6/24?    Lab Results   Component Value Date    CHOL 207 (H) 04/17/2023    TRIG 97 04/17/2023    HDL 87 04/17/2023     Lab Results   Component Value Date     (H) 04/17/2023      ALT   Date Value Ref Range Status   04/17/2023 26 0 - 32 U/L Final     AST   Date Value Ref Range Status   04/17/2023 19 0 - 31 U/L Final     The ASCVD Risk score (Varun DK, et al., 2019) failed to calculate for the following reasons:    The 2019 ASCVD risk score is only valid for ages 40 to 79     PLAN  The following are interventions that have been identified:   Patient OVERDUE refilling rosuvastatin 10mg, HALF tab daily, and active on home medication list.  - May have had surplus from previous mail order fills?  Patient NEEDS REFILLS for rosuvastatin    Will pend refill request to prescriber.    Last Visit: 10/25/24  Next Visit: 4/25/25    Katharina Gaviria, PharmD, BCACP  Population Health Pharmacist  [facility] Clinical Pharmacy  Department, toll free: [phone], option 1

## 2024-11-19 NOTE — TELEPHONE ENCOUNTER
Noted rosuvastatin rx for 100ds approved, thank you!    Letter sent to patient.    =======================================================   For Pharmacy Admin Tracking Only    Program: Pop Health  CPA in place:  No  Recommendation Provided To: Provider: 1 via Note to Provider  Intervention Detail: Refill(s) Provided  Intervention Accepted By: Provider: 1  Gap Closed?: No   Time Spent (min): 10

## 2024-11-20 RX ORDER — OMEPRAZOLE 40 MG/1
40 CAPSULE, DELAYED RELEASE ORAL DAILY
Qty: 90 CAPSULE | Refills: 1 | Status: SHIPPED | OUTPATIENT
Start: 2024-11-20

## 2024-12-23 DIAGNOSIS — I10 PRIMARY HYPERTENSION: ICD-10-CM

## 2024-12-23 RX ORDER — HYDROCHLOROTHIAZIDE 25 MG/1
25 TABLET ORAL DAILY
Qty: 90 TABLET | Refills: 1 | Status: SHIPPED | OUTPATIENT
Start: 2024-12-23

## 2024-12-23 NOTE — TELEPHONE ENCOUNTER
Name of Medication(s) Requested:  Requested Prescriptions     Pending Prescriptions Disp Refills    hydroCHLOROthiazide (HYDRODIURIL) 25 MG tablet [Pharmacy Med Name: HYDROCHLOROT TAB 25MG] 90 tablet 1     Sig: TAKE 1 TABLET DAILY       Medication is on current medication list Yes    Dosage and directions were verified? Yes    Quantity verified: 90 day supply     Pharmacy Verified?  Yes    Last Appointment:  10/25/2024    Future appts:  Future Appointments   Date Time Provider Department Center   3/24/2025  3:00 PM Сергей Contreras MD [facility]   4/25/2025  3:30 PM Remy Billy APRN - CNP [facility] DEP        (If no appt send self scheduling link. .REFILLAPPT)  Scheduling request sent?     [] Yes  [x] No    Does patient need updated?  [] Yes  [x] No

## 2025-01-03 ENCOUNTER — OFFICE VISIT (OUTPATIENT)
Dept: PRIMARY CARE CLINIC | Age: 85
End: 2025-01-03

## 2025-01-03 VITALS
OXYGEN SATURATION: 95 % | BODY MASS INDEX: 27.09 KG/M2 | RESPIRATION RATE: 22 BRPM | SYSTOLIC BLOOD PRESSURE: 102 MMHG | DIASTOLIC BLOOD PRESSURE: 64 MMHG | HEIGHT: 60 IN | WEIGHT: 138 LBS | HEART RATE: 22 BPM | TEMPERATURE: 98.5 F

## 2025-01-03 DIAGNOSIS — H61.23 BILATERAL IMPACTED CERUMEN: ICD-10-CM

## 2025-01-03 DIAGNOSIS — R05.1 ACUTE COUGH: ICD-10-CM

## 2025-01-03 DIAGNOSIS — R09.89 LUNG CRACKLES: Primary | ICD-10-CM

## 2025-01-03 DIAGNOSIS — R09.82 PND (POST-NASAL DRIP): ICD-10-CM

## 2025-01-03 DIAGNOSIS — R07.0 THROAT DISCOMFORT: ICD-10-CM

## 2025-01-03 LAB
INFLUENZA A ANTIGEN, POC: NEGATIVE
INFLUENZA B ANTIGEN, POC: NEGATIVE
LOT EXPIRE DATE: NORMAL
LOT KIT NUMBER: NORMAL
S PYO AG THROAT QL: NORMAL
SARS-COV-2, POC: NORMAL
VALID INTERNAL CONTROL: NORMAL
VENDOR AND KIT NAME POC: NORMAL

## 2025-01-03 RX ORDER — PREDNISONE 10 MG/1
10 TABLET ORAL 2 TIMES DAILY
Qty: 6 TABLET | Refills: 0 | Status: SHIPPED | OUTPATIENT
Start: 2025-01-03 | End: 2025-01-06

## 2025-01-03 RX ORDER — AZITHROMYCIN 250 MG/1
TABLET, FILM COATED ORAL
Qty: 6 TABLET | Refills: 0 | Status: SHIPPED | OUTPATIENT
Start: 2025-01-03 | End: 2025-01-13

## 2025-01-03 RX ORDER — GUAIFENESIN 600 MG/1
600 TABLET, EXTENDED RELEASE ORAL 2 TIMES DAILY
Qty: 30 TABLET | Refills: 0 | Status: SHIPPED | OUTPATIENT
Start: 2025-01-03 | End: 2025-01-18

## 2025-01-03 ASSESSMENT — PATIENT HEALTH QUESTIONNAIRE - PHQ9
SUM OF ALL RESPONSES TO PHQ QUESTIONS 1-9: 0
6. FEELING BAD ABOUT YOURSELF - OR THAT YOU ARE A FAILURE OR HAVE LET YOURSELF OR YOUR FAMILY DOWN: NOT AT ALL
SUM OF ALL RESPONSES TO PHQ9 QUESTIONS 1 & 2: 0
8. MOVING OR SPEAKING SO SLOWLY THAT OTHER PEOPLE COULD HAVE NOTICED. OR THE OPPOSITE, BEING SO FIGETY OR RESTLESS THAT YOU HAVE BEEN MOVING AROUND A LOT MORE THAN USUAL: NOT AT ALL
SUM OF ALL RESPONSES TO PHQ QUESTIONS 1-9: 0
1. LITTLE INTEREST OR PLEASURE IN DOING THINGS: NOT AT ALL
4. FEELING TIRED OR HAVING LITTLE ENERGY: NOT AT ALL
5. POOR APPETITE OR OVEREATING: NOT AT ALL
SUM OF ALL RESPONSES TO PHQ9 QUESTIONS 1 & 2: 0
SUM OF ALL RESPONSES TO PHQ QUESTIONS 1-9: 0
2. FEELING DOWN, DEPRESSED OR HOPELESS: NOT AT ALL
SUM OF ALL RESPONSES TO PHQ QUESTIONS 1-9: 0
3. TROUBLE FALLING OR STAYING ASLEEP: NOT AT ALL
7. TROUBLE CONCENTRATING ON THINGS, SUCH AS READING THE NEWSPAPER OR WATCHING TELEVISION: NOT AT ALL
SUM OF ALL RESPONSES TO PHQ QUESTIONS 1-9: 0
1. LITTLE INTEREST OR PLEASURE IN DOING THINGS: NOT AT ALL
SUM OF ALL RESPONSES TO PHQ QUESTIONS 1-9: 0
2. FEELING DOWN, DEPRESSED OR HOPELESS: NOT AT ALL
9. THOUGHTS THAT YOU WOULD BE BETTER OFF DEAD, OR OF HURTING YOURSELF: NOT AT ALL
SUM OF ALL RESPONSES TO PHQ QUESTIONS 1-9: 0
SUM OF ALL RESPONSES TO PHQ QUESTIONS 1-9: 0

## 2025-01-03 NOTE — PROGRESS NOTES
Influenza A/B   Result Value Ref Range    SARS-COV-2, POC Not-Detected Not Detected    Influenza A Antigen, POC Negative Not Detected    Influenza B Antigen, POC Negative Not Detected    Vendor and kit name [employer]     Internal Control pass     Lot/Kit Number [id number]     Lot/Kit  date: 25    POCT rapid strep A   Result Value Ref Range    Strep A Ag None Detected None Detected       Imaging:  No results found.    Assessment / Plan     Impression(s):  Randi was seen today for other.    Diagnoses and all orders for this visit:    Lung crackles  -     XR CHEST STANDARD (2 VW); Future    Acute cough  -     POCT COVID-19 & Influenza A/B  -     POCT rapid strep A  -     XR CHEST STANDARD (2 VW); Future    Throat discomfort  -     POCT COVID-19 & Influenza A/B  -     POCT rapid strep A    Bilateral impacted cerumen  -     REMOVAL IMPACTED CERUMEN IRRIGATION/LVG UNILAT    PND (post-nasal drip)    Other orders  -     predniSONE (DELTASONE) 10 MG tablet; Take 1 tablet by mouth 2 times daily for 3 days  -     azithromycin (ZITHROMAX) 250 MG tablet; 500mg on day 1 followed by 250mg on days 2 - 5  -     guaiFENesin (MUCINEX) 600 MG extended release tablet; Take 1 tablet by mouth 2 times daily for 15 days        Pt advised that illness is likely pneumonia and should resolve with time and conservative measures. Increase fluids and rest. Script written for Azithromycin, side effects discussed. Additional symptomatic relief discussed. PCP Return if symptoms worsen or fail to improve. if symptoms persist. ED sooner if symptoms worsen or change. Red flag symptoms discussed. Pt is in agreement with this care plan. All questions answered. Patient advised this is likely an infection that should resolve with time and conservative treatment. Prescription sent, side effects discussed. Pt advised to f/u with PCP Return if symptoms worsen or fail to improve. for recheck.  ER if changes or worse.  Advised to take all medications as

## 2025-01-06 ENCOUNTER — TELEPHONE (OUTPATIENT)
Dept: PRIMARY CARE CLINIC | Age: 85
End: 2025-01-06

## 2025-01-09 DIAGNOSIS — R05.1 ACUTE COUGH: ICD-10-CM

## 2025-01-09 DIAGNOSIS — R09.89 LUNG CRACKLES: ICD-10-CM

## 2025-01-23 DIAGNOSIS — G31.9 CEREBRAL ATROPHY (HCC): ICD-10-CM

## 2025-01-24 RX ORDER — RALOXIFENE HYDROCHLORIDE 60 MG/1
60 TABLET, FILM COATED ORAL DAILY
Qty: 90 TABLET | Refills: 1 | Status: SHIPPED | OUTPATIENT
Start: 2025-01-24

## 2025-01-24 RX ORDER — DONEPEZIL HYDROCHLORIDE 10 MG/1
10 TABLET, FILM COATED ORAL NIGHTLY
Qty: 90 TABLET | Refills: 1 | Status: SHIPPED | OUTPATIENT
Start: 2025-01-24

## 2025-01-24 NOTE — TELEPHONE ENCOUNTER
Name of Medication(s) Requested:  Requested Prescriptions     Pending Prescriptions Disp Refills    raloxifene (EVISTA) 60 MG tablet [Pharmacy Med Name: RALOXIFENE HCL 60MG TABS] 90 tablet 1     Sig: TAKE ONE TABLET BY MOUTH EVERY DAY    donepezil (ARICEPT) 10 MG tablet [Pharmacy Med Name: DONEPEZIL HCL 10MG TABS] 90 tablet 1     Sig: TAKE ONE TABLET BY MOUTH NIGHTLY       Medication is on current medication list Yes    Dosage and directions were verified? Yes    Quantity verified: 90 day supply     Pharmacy Verified?  Yes    Last Appointment:  1/3/2025    Future appts:  Future Appointments   Date Time Provider Department Center   3/24/2025  3:00 PM Сергей Contreras MD [facility]   4/25/2025  3:30 PM Remy Billy APRN - CNP [facility]        (If no appt send self scheduling link. .REFILLAPPT)  Scheduling request sent?     [] Yes  [x] No    Does patient need updated?  [] Yes  [x] No

## 2025-02-17 DIAGNOSIS — E78.00 HYPERCHOLESTEROLEMIA: ICD-10-CM

## 2025-02-17 DIAGNOSIS — I10 PRIMARY HYPERTENSION: ICD-10-CM

## 2025-02-17 RX ORDER — LOSARTAN POTASSIUM 100 MG/1
50 TABLET ORAL DAILY
Qty: 90 TABLET | Refills: 1 | Status: SHIPPED | OUTPATIENT
Start: 2025-02-17

## 2025-02-17 RX ORDER — ROSUVASTATIN CALCIUM 10 MG/1
5 TABLET, COATED ORAL DAILY
Qty: 50 TABLET | Refills: 1 | Status: SHIPPED | OUTPATIENT
Start: 2025-02-17

## 2025-02-17 NOTE — TELEPHONE ENCOUNTER
Name of Medication(s) Requested:  Requested Prescriptions     Pending Prescriptions Disp Refills    rosuvastatin (CRESTOR) 10 MG tablet [Pharmacy Med Name: ROSUVASTATIN TAB 10MG] 50 tablet 1     Sig: TAKE 1/2 TABLET DAILY    losartan (COZAAR) 100 MG tablet [Pharmacy Med Name: LOSARTAN POT TAB 100MG] 90 tablet 1     Sig: Take 0.5 tablets by mouth daily       Medication is on current medication list Yes    Dosage and directions were verified? Yes    Quantity verified: 90 day supply     Pharmacy Verified?  Yes    Last Appointment:  1/3/2025    Future appts:  Future Appointments   Date Time Provider Department Center   3/24/2025  3:00 PM Сергей Contreras MD [facility]   4/25/2025  3:30 PM Remy Billy APRN - CNP [facility]        (If no appt send self scheduling link. .REFILLAPPT)  Scheduling request sent?     [] Yes  [x] No    Does patient need updated?  [] Yes  [x] No

## 2025-03-20 DIAGNOSIS — I10 PRIMARY HYPERTENSION: ICD-10-CM

## 2025-03-20 RX ORDER — AMLODIPINE BESYLATE 5 MG/1
5 TABLET ORAL DAILY
Qty: 90 TABLET | Refills: 1 | Status: SHIPPED | OUTPATIENT
Start: 2025-03-20

## 2025-03-20 NOTE — TELEPHONE ENCOUNTER
Name of Medication(s) Requested:  Requested Prescriptions     Pending Prescriptions Disp Refills    amLODIPine (NORVASC) 5 MG tablet [Pharmacy Med Name: AMLODIPINE BESYLATE 5MG TABS] 90 tablet 1     Sig: TAKE ONE TABLET BY MOUTH EVERY DAY       Medication is on current medication list Yes    Dosage and directions were verified? Yes    Quantity verified: 90 day supply     Pharmacy Verified?  Yes    Last Appointment:  1/3/2025    Future appts:  Future Appointments   Date Time Provider Department Center   3/24/2025  3:00 PM Сергей Contreras MD [facility]   4/25/2025  3:30 PM Remy Billy APRN - CNP [facility]        (If no appt send self scheduling link. .REFILLAPPT)  Scheduling request sent?     [] Yes  [x] No    Does patient need updated?  [] Yes  [x] No

## 2025-03-24 ENCOUNTER — OFFICE VISIT (OUTPATIENT)
Dept: GERIATRIC MEDICINE | Age: 85
End: 2025-03-24
Payer: MEDICARE

## 2025-03-24 VITALS
BODY MASS INDEX: 30.97 KG/M2 | SYSTOLIC BLOOD PRESSURE: 110 MMHG | HEART RATE: 87 BPM | DIASTOLIC BLOOD PRESSURE: 60 MMHG | OXYGEN SATURATION: 95 % | WEIGHT: 158.6 LBS | TEMPERATURE: 98.6 F

## 2025-03-24 DIAGNOSIS — F33.1 MAJOR DEPRESSIVE DISORDER, RECURRENT, MODERATE (HCC): ICD-10-CM

## 2025-03-24 DIAGNOSIS — F01.A18 MILD VASCULAR DEMENTIA WITH OTHER BEHAVIORAL DISTURBANCE: ICD-10-CM

## 2025-03-24 DIAGNOSIS — F01.B0 MODERATE VASCULAR DEMENTIA, UNSPECIFIED WHETHER BEHAVIORAL, PSYCHOTIC, OR MOOD DISTURBANCE OR ANXIETY (HCC): Primary | ICD-10-CM

## 2025-03-24 PROCEDURE — 1159F MED LIST DOCD IN RCRD: CPT | Performed by: INTERNAL MEDICINE

## 2025-03-24 PROCEDURE — 99213 OFFICE O/P EST LOW 20 MIN: CPT | Performed by: INTERNAL MEDICINE

## 2025-03-24 PROCEDURE — 1160F RVW MEDS BY RX/DR IN RCRD: CPT | Performed by: INTERNAL MEDICINE

## 2025-03-24 PROCEDURE — 3074F SYST BP LT 130 MM HG: CPT | Performed by: INTERNAL MEDICINE

## 2025-03-24 PROCEDURE — 1123F ACP DISCUSS/DSCN MKR DOCD: CPT | Performed by: INTERNAL MEDICINE

## 2025-03-24 PROCEDURE — 3078F DIAST BP <80 MM HG: CPT | Performed by: INTERNAL MEDICINE

## 2025-03-24 SDOH — ECONOMIC STABILITY: FOOD INSECURITY: WITHIN THE PAST 12 MONTHS, YOU WORRIED THAT YOUR FOOD WOULD RUN OUT BEFORE YOU GOT MONEY TO BUY MORE.: NEVER TRUE

## 2025-03-24 SDOH — ECONOMIC STABILITY: FOOD INSECURITY: WITHIN THE PAST 12 MONTHS, THE FOOD YOU BOUGHT JUST DIDN'T LAST AND YOU DIDN'T HAVE MONEY TO GET MORE.: NEVER TRUE

## 2025-03-24 NOTE — PROGRESS NOTES
CC Evaluate memory    Here again with the other son and still short term 10 to 20 times repeating   Repetition noted as she repeated 5 to 6 times about her nephew orthopedist.  Pleasant and active  OA knees   Knows address   Sleeps at night and urinates 2 to 3  times  Wears depends   Lonely and bored  [place]  Home[facility] [employer] in [place]  Impression: Stable Dementia  Plan:Continue same            Donepezil 10 mg and Namedna 10 mg a day

## 2025-04-15 RX ORDER — ESCITALOPRAM OXALATE 10 MG/1
10 TABLET ORAL DAILY
Qty: 90 TABLET | Refills: 1 | Status: SHIPPED | OUTPATIENT
Start: 2025-04-15

## 2025-04-15 NOTE — TELEPHONE ENCOUNTER
Name of Medication(s) Requested:  Requested Prescriptions     Pending Prescriptions Disp Refills    escitalopram (LEXAPRO) 10 MG tablet [Pharmacy Med Name: ESCITALOPRAM OXALATE 10MG TABS] 90 tablet 1     Sig: TAKE ONE TABLET BY MOUTH EVERY DAY       Medication is on current medication list Yes    Dosage and directions were verified? Yes    Quantity verified: 90 day supply     Pharmacy Verified?  Yes    Last Appointment:  1/3/2025    Future appts:  Future Appointments   Date Time Provider Department Center   4/25/2025  3:30 PM Remy Billy APRN - CNP [facility]   9/30/2025  2:45 PM Сергей Conrteras MD [facility]        (If no appt send self scheduling link. .REFILLAPPT)  Scheduling request sent?     [] Yes  [x] No    Does patient need updated?  [] Yes  [x] No

## 2025-04-25 ENCOUNTER — OFFICE VISIT (OUTPATIENT)
Dept: PRIMARY CARE CLINIC | Age: 85
End: 2025-04-25

## 2025-04-25 VITALS
DIASTOLIC BLOOD PRESSURE: 60 MMHG | HEART RATE: 100 BPM | SYSTOLIC BLOOD PRESSURE: 118 MMHG | BODY MASS INDEX: 31.61 KG/M2 | OXYGEN SATURATION: 98 % | TEMPERATURE: 98 F | WEIGHT: 161 LBS | RESPIRATION RATE: 16 BRPM | HEIGHT: 60 IN

## 2025-04-25 VITALS
RESPIRATION RATE: 16 BRPM | SYSTOLIC BLOOD PRESSURE: 118 MMHG | HEIGHT: 60 IN | WEIGHT: 161 LBS | HEART RATE: 100 BPM | TEMPERATURE: 98 F | BODY MASS INDEX: 31.61 KG/M2 | OXYGEN SATURATION: 98 % | DIASTOLIC BLOOD PRESSURE: 60 MMHG

## 2025-04-25 DIAGNOSIS — I10 PRIMARY HYPERTENSION: Primary | ICD-10-CM

## 2025-04-25 DIAGNOSIS — Z13.29 SCREENING FOR THYROID DISORDER: ICD-10-CM

## 2025-04-25 DIAGNOSIS — E78.00 HYPERCHOLESTEROLEMIA: ICD-10-CM

## 2025-04-25 DIAGNOSIS — K57.90 DIVERTICULOSIS: ICD-10-CM

## 2025-04-25 DIAGNOSIS — Z00.00 MEDICARE ANNUAL WELLNESS VISIT, SUBSEQUENT: Primary | ICD-10-CM

## 2025-04-25 DIAGNOSIS — F01.A18 MILD VASCULAR DEMENTIA WITH OTHER BEHAVIORAL DISTURBANCE (HCC): ICD-10-CM

## 2025-04-25 DIAGNOSIS — R09.82 PND (POST-NASAL DRIP): ICD-10-CM

## 2025-04-25 DIAGNOSIS — E55.9 VITAMIN D DEFICIENCY: ICD-10-CM

## 2025-04-25 DIAGNOSIS — I67.9 CEREBROVASCULAR SMALL VESSEL DISEASE: ICD-10-CM

## 2025-04-25 DIAGNOSIS — G31.9 CEREBRAL ATROPHY: ICD-10-CM

## 2025-04-25 DIAGNOSIS — F33.0 MAJOR DEPRESSIVE DISORDER, RECURRENT, MILD: ICD-10-CM

## 2025-04-25 RX ORDER — LEVOCETIRIZINE DIHYDROCHLORIDE 5 MG/1
5 TABLET, FILM COATED ORAL NIGHTLY
Qty: 90 TABLET | Refills: 1 | Status: SHIPPED | OUTPATIENT
Start: 2025-04-25

## 2025-04-25 RX ORDER — MEMANTINE HYDROCHLORIDE 5 MG/1
5 TABLET ORAL 2 TIMES DAILY
Qty: 180 TABLET | Refills: 1 | Status: CANCELLED | OUTPATIENT
Start: 2025-04-25

## 2025-04-25 RX ORDER — MELOXICAM 7.5 MG/1
7.5 TABLET ORAL DAILY
Qty: 90 TABLET | Refills: 3 | Status: SHIPPED | OUTPATIENT
Start: 2025-04-25

## 2025-04-25 RX ORDER — MEMANTINE HYDROCHLORIDE 21 MG/1
21 CAPSULE, EXTENDED RELEASE ORAL DAILY
Qty: 90 CAPSULE | Refills: 3 | Status: SHIPPED | OUTPATIENT
Start: 2025-04-25

## 2025-04-25 SDOH — ECONOMIC STABILITY: FOOD INSECURITY: WITHIN THE PAST 12 MONTHS, THE FOOD YOU BOUGHT JUST DIDN'T LAST AND YOU DIDN'T HAVE MONEY TO GET MORE.: NEVER TRUE

## 2025-04-25 SDOH — ECONOMIC STABILITY: FOOD INSECURITY: WITHIN THE PAST 12 MONTHS, YOU WORRIED THAT YOUR FOOD WOULD RUN OUT BEFORE YOU GOT MONEY TO BUY MORE.: NEVER TRUE

## 2025-04-25 ASSESSMENT — ENCOUNTER SYMPTOMS
NAUSEA: 0
SHORTNESS OF BREATH: 0
CHEST TIGHTNESS: 0
WHEEZING: 0
VOMITING: 0
CONSTIPATION: 0
ABDOMINAL PAIN: 0
BACK PAIN: 0
COUGH: 0
VOICE CHANGE: 0
TROUBLE SWALLOWING: 0
DIARRHEA: 0
BLOOD IN STOOL: 0

## 2025-04-25 ASSESSMENT — PATIENT HEALTH QUESTIONNAIRE - PHQ9
SUM OF ALL RESPONSES TO PHQ QUESTIONS 1-9: 0
7. TROUBLE CONCENTRATING ON THINGS, SUCH AS READING THE NEWSPAPER OR WATCHING TELEVISION: NOT AT ALL
5. POOR APPETITE OR OVEREATING: NOT AT ALL
10. IF YOU CHECKED OFF ANY PROBLEMS, HOW DIFFICULT HAVE THESE PROBLEMS MADE IT FOR YOU TO DO YOUR WORK, TAKE CARE OF THINGS AT HOME, OR GET ALONG WITH OTHER PEOPLE: NOT DIFFICULT AT ALL
SUM OF ALL RESPONSES TO PHQ QUESTIONS 1-9: 0
8. MOVING OR SPEAKING SO SLOWLY THAT OTHER PEOPLE COULD HAVE NOTICED. OR THE OPPOSITE, BEING SO FIGETY OR RESTLESS THAT YOU HAVE BEEN MOVING AROUND A LOT MORE THAN USUAL: NOT AT ALL
1. LITTLE INTEREST OR PLEASURE IN DOING THINGS: NOT AT ALL
SUM OF ALL RESPONSES TO PHQ QUESTIONS 1-9: 0
SUM OF ALL RESPONSES TO PHQ QUESTIONS 1-9: 0
6. FEELING BAD ABOUT YOURSELF - OR THAT YOU ARE A FAILURE OR HAVE LET YOURSELF OR YOUR FAMILY DOWN: NOT AT ALL
2. FEELING DOWN, DEPRESSED OR HOPELESS: NOT AT ALL
4. FEELING TIRED OR HAVING LITTLE ENERGY: NOT AT ALL
3. TROUBLE FALLING OR STAYING ASLEEP: NOT AT ALL
9. THOUGHTS THAT YOU WOULD BE BETTER OFF DEAD, OR OF HURTING YOURSELF: NOT AT ALL

## 2025-04-25 NOTE — PROGRESS NOTES
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     Subjective   The following acute and/or chronic problems were also addressed today:  Weight gain    Patient's complete Health Risk Assessment and screening values have been reviewed and are found in Flowsheets. The following problems were reviewed today and where indicated follow up appointments were made and/or referrals ordered.    Positive Risk Factor Screenings with Interventions:              Inactivity:  On average, how many days per week do you engage in moderate to strenuous exercise (like a brisk walk)?: 0 days (!) Abnormal  On average, how many minutes do you engage in exercise at this level?: 0 min  Interventions:  See AVS for additional education material     Abnormal BMI (obese):  Body mass index is 31.44 kg/m². (!) Abnormal  Interventions:  See AVS for additional education material    Obesity Counseling: Patient was asked about her current diet and exercise habits, and personalized advice was provided regarding recommended lifestyle changes. Patient's comorbid health conditions associated with elevated BMI were discussed, as well as the likely benefits of weight loss. Based upon patient's motivation to change her behavior, the following plan was agreed upon to work toward a weight loss goal of 10 pounds: lower carbohydrate diet. Educational materials for weight loss were provided.  Patient will follow-up in 6 month(s) with PCP. Time spent counseling patient: 4 minutes      Vision Screen:  Do you have difficulty driving, watching TV, or doing any of your daily activities because of your eyesight?: No  Have you had an eye exam within the past year?: (!) No  Interventions:   See AVS for additional education material     ADL's:   Patient

## 2025-04-25 NOTE — PATIENT INSTRUCTIONS
Learning About Being Active as an Older Adult  Why is being active important as you get older?     Being active is one of the best things you can do for your health. And it's never too late to start. Being active--or getting active, if you aren't already--has definite benefits. It can:  Give you more energy,  Keep your mind sharp.  Improve balance to reduce your risk of falls.  Help you manage chronic illness with fewer medicines.  No matter how old you are, how fit you are, or what health problems you have, there is a form of activity that will work for you. And the more physical activity you can do, the better your overall health will be.  What kinds of activity can help you stay healthy?  Being more active will make your daily activities easier. Physical activity includes planned exercise and things you do in daily life. There are four types of activity:  Aerobic.  Doing aerobic activity makes your heart and lungs strong.  Includes walking, dancing, and gardening.  Aim for at least 2½ hours spread throughout the week.  It improves your energy and can help you sleep better.  Muscle-strengthening.  This type of activity can help maintain muscle and strengthen bones.  Includes climbing stairs, using resistance bands, and lifting or carrying heavy loads.  Aim for at least twice a week.  It can help protect the knees and other joints.  Stretching.  Stretching gives you better range of motion in joints and muscles.  Includes upper arm stretches, calf stretches, and gentle yoga.  Aim for at least twice a week, preferably after your muscles are warmed up from other activities.  It can help you function better in daily life.  Balancing.  This helps you stay coordinated and have good posture.  Includes heel-to-toe walking, tu chi, and certain types of yoga.  Aim for at least 3 days a week.  It can reduce your risk of falling.  Even if you have a hard time meeting the recommendations, it's better to be more active

## 2025-04-28 RX ORDER — MEMANTINE HYDROCHLORIDE 5 MG/1
5 TABLET ORAL 2 TIMES DAILY
Qty: 180 TABLET | Refills: 1 | OUTPATIENT
Start: 2025-04-28

## 2025-04-28 NOTE — TELEPHONE ENCOUNTER
Name of Medication(s) Requested:  Requested Prescriptions     Pending Prescriptions Disp Refills    memantine (NAMENDA) 5 MG tablet [Pharmacy Med Name: MEMANTINE HCL 5MG TABS] 180 tablet 1     Sig: TAKE ONE TABLET BY MOUTH TWICE A DAY       Medication is on current medication list Yes    Dosage and directions were verified? Yes    Quantity verified: 90 day supply     Pharmacy Verified?  Yes    Last Appointment:  4/25/2025    Future appts:  Future Appointments   Date Time Provider Department Center   9/30/2025  2:45 PM Сергей Contreras MD [facility]        (If no appt send self scheduling link. .REFILLAPPT)  Scheduling request sent?     [] Yes  [x] No    Does patient need updated?  [] Yes  [x] No

## 2025-05-14 RX ORDER — OMEPRAZOLE 40 MG/1
40 CAPSULE, DELAYED RELEASE ORAL DAILY
Qty: 90 CAPSULE | Refills: 1 | Status: SHIPPED | OUTPATIENT
Start: 2025-05-14

## 2025-05-14 NOTE — TELEPHONE ENCOUNTER
Name of Medication(s) Requested:  Requested Prescriptions     Pending Prescriptions Disp Refills    omeprazole (PRILOSEC) 40 MG delayed release capsule [Pharmacy Med Name: OMEPRAZOLE 40MG CPDR] 90 capsule 1     Sig: TAKE ONE CAPSULE BY MOUTH EVERY DAY       Medication is on current medication list Yes    Dosage and directions were verified? Yes    Quantity verified: 90 day supply     Pharmacy Verified?  Yes    Last Appointment:  4/25/2025    Future appts:  Future Appointments   Date Time Provider Department Center   9/30/2025  2:45 PM Сергей Contreras MD [facility]        (If no appt send self scheduling link. .REFILLAPPT)  Scheduling request sent?     [] Yes  [] No    Does patient need updated?  [] Yes  [] No

## 2025-06-09 DIAGNOSIS — I10 PRIMARY HYPERTENSION: ICD-10-CM

## 2025-06-09 RX ORDER — HYDROCHLOROTHIAZIDE 25 MG/1
25 TABLET ORAL DAILY
Qty: 90 TABLET | Refills: 1 | Status: SHIPPED | OUTPATIENT
Start: 2025-06-09

## 2025-06-09 NOTE — TELEPHONE ENCOUNTER
Name of Medication(s) Requested:  Requested Prescriptions     Pending Prescriptions Disp Refills    hydroCHLOROthiazide (HYDRODIURIL) 25 MG tablet [Pharmacy Med Name: HYDROCHLOROT TAB 25MG] 90 tablet 1     Sig: TAKE 1 TABLET DAILY       Medication is on current medication list Yes    Dosage and directions were verified? Yes    Quantity verified: 90 day supply     Pharmacy Verified?  Yes    Last Appointment:  4/25/2025    Future appts:  Future Appointments   Date Time Provider Department Center   9/30/2025  2:45 PM Сергей Contreras MD [facility]        (If no appt send self scheduling link. .REFILLAPPT)  Scheduling request sent?     [] Yes  [x] No    Does patient need updated?  [] Yes  [x] No